# Patient Record
Sex: MALE | Race: OTHER | HISPANIC OR LATINO
[De-identification: names, ages, dates, MRNs, and addresses within clinical notes are randomized per-mention and may not be internally consistent; named-entity substitution may affect disease eponyms.]

---
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
## 2018-03-27 ENCOUNTER — RECORD ABSTRACTING (OUTPATIENT)
Age: 18
End: 2018-03-27

## 2018-08-14 ENCOUNTER — TRANSCRIPTION ENCOUNTER (OUTPATIENT)
Age: 18
End: 2018-08-14

## 2018-08-23 LAB
ALMOND IGE QN: <0.1 KUA/L
BRAZIL NUT IGE QN: <0.1 KUA/L
COCONUT IGE QN: 0
COCONUT IGE QN: <0.1 KUA/L
DEPRECATED ALMOND IGE RAST QL: 0
DEPRECATED BRAZIL NUT IGE RAST QL: 0
DEPRECATED HAZELNUT IGE RAST QL: 0
DEPRECATED PEANUT IGE RAST QL: 0
HAZELNUT IGE QN: <0.1 KUA/L
PEANUT (RARA H) 1 IGE QN: <0.1 KUA/L
PEANUT (RARA H) 2 IGE QN: <0.1 KUA/L
PEANUT (RARA H) 3 IGE QN: <0.1 KUA/L
PEANUT (RARA H) 8 IGE QN: <0.1 KUA/L
PEANUT (RARA H) 9 IGE QN: <0.1 KUA/L
PEANUT IGE QN: <0.1 KUA/L
RARA H1 STORAGE PROTEIN (F422) CLASS: 0 (ref 0–?)
RARA H2 STORAGE PROTEIN (F423) CLASS: 0 (ref 0–?)
RARA H3 STORAGE PROTEIN (F424) CLASS: 0 (ref 0–?)
RARA H8 PR-10 PROTEIN (F352) CLASS: 0 (ref 0–?)
RARA H9 LIPID TRANSFERTP (F427) CLASS: 0 (ref 0–?)

## 2018-09-28 ENCOUNTER — APPOINTMENT (OUTPATIENT)
Dept: PEDIATRICS | Facility: CLINIC | Age: 18
End: 2018-09-28

## 2019-03-08 ENCOUNTER — EMERGENCY (EMERGENCY)
Facility: HOSPITAL | Age: 19
LOS: 1 days | Discharge: HOME | End: 2019-03-08
Attending: EMERGENCY MEDICINE
Payer: COMMERCIAL

## 2019-03-08 VITALS
RESPIRATION RATE: 17 BRPM | HEART RATE: 96 BPM | OXYGEN SATURATION: 99 % | SYSTOLIC BLOOD PRESSURE: 130 MMHG | DIASTOLIC BLOOD PRESSURE: 68 MMHG | TEMPERATURE: 99 F

## 2019-03-08 PROCEDURE — 73090 X-RAY EXAM OF FOREARM: CPT | Mod: 26,LT

## 2019-03-08 PROCEDURE — 73110 X-RAY EXAM OF WRIST: CPT | Mod: 26,LT

## 2019-03-08 PROCEDURE — 73110 X-RAY EXAM OF WRIST: CPT

## 2019-03-08 PROCEDURE — 73130 X-RAY EXAM OF HAND: CPT

## 2019-03-08 PROCEDURE — 99283 EMERGENCY DEPT VISIT LOW MDM: CPT

## 2019-03-08 PROCEDURE — 73120 X-RAY EXAM OF HAND: CPT

## 2019-03-08 PROCEDURE — 73120 X-RAY EXAM OF HAND: CPT | Mod: 26,LT

## 2019-03-08 PROCEDURE — 73130 X-RAY EXAM OF HAND: CPT | Mod: 26,LT

## 2019-03-08 PROCEDURE — 99284 EMERGENCY DEPT VISIT MOD MDM: CPT

## 2019-03-08 PROCEDURE — 73090 X-RAY EXAM OF FOREARM: CPT

## 2019-03-08 RX ORDER — ACETAMINOPHEN 500 MG
650 TABLET ORAL ONCE
Qty: 0 | Refills: 0 | Status: COMPLETED | OUTPATIENT
Start: 2019-03-08 | End: 2019-03-08

## 2019-03-08 RX ORDER — IBUPROFEN 200 MG
600 TABLET ORAL ONCE
Qty: 0 | Refills: 0 | Status: COMPLETED | OUTPATIENT
Start: 2019-03-08 | End: 2019-03-08

## 2019-03-08 RX ADMIN — Medication 600 MILLIGRAM(S): at 17:50

## 2019-03-08 RX ADMIN — Medication 650 MILLIGRAM(S): at 17:46

## 2019-03-08 NOTE — ED PROVIDER NOTE - OBJECTIVE STATEMENT
17 y/o M w no significant pmhx or pshx p/w l wrist pain  s/p injury x2weeks. Says he was weight lifting when injury occurred, wrist locked into place and bent back, pt continued weightlifting, wrist locked again and bar (35 lb on each side) fell on top of wrist, pt heard a 'crack'. +numbness tingling. +difficulty moving wrist. Pt has brace present. Endorsed Motrin twice in past 2 days. Has been icing the region. Not on daily meds. 17 y/o M w no significant pmhx or pshx p/w l wrist pain s/p injury x2weeks. Says he was weight lifting when injury occurred, wrist locked into place and bent back, pt continued weightlifting, wrist locked again and bar (35 lb on each side) fell on top of wrist, pt heard a 'crack'. +numbness tingling. +difficulty moving wrist. Pt has brace present. Endorsed Motrin twice in past 2 days. Has been icing the region. Not on daily meds.

## 2019-03-08 NOTE — ED PROVIDER NOTE - LOCATION
wrist wrist/strong radial, weak ulnar pulse able to oppose thumb to all digits,  intrinsic  muscles of hand all intact  no significant ulnar tenderness  swelling of ulnar aspect, posterior displacement of ulnar styloid process, posterior lateral displacement

## 2019-03-08 NOTE — ED PROVIDER NOTE - CLINICAL SUMMARY MEDICAL DECISION MAKING FREE TEXT BOX
17 y/o M w no sig pmhx or pshx s/p mechanical injury to left wrist, w exam consistent w possible displacement of ulna. get x ray likely place and splint w close ortho f/u vs ortho f/u here if intra articular or sig displacement . pain control and reassess. - DO Chance 19 y/o M w no sig pmhx or pshx s/p mechanical injury to left wrist, w exam consistent w possible displacement of ulna. get x ray likely place and splint w close ortho f/u vs ortho f/u here if intra articular or sig displacement . pain control and reassess. - DO Layne Fletcher MD - Attending Physician: Pt here with left wrist injury. +Pain with movement. No deformity. Xray, motrin, f/u outpatient

## 2019-03-08 NOTE — ED PROVIDER NOTE - ATTENDING CONTRIBUTION TO CARE
Layne Thomason MD - Attending Physician: I have personally seen and examined this patient with the resident/fellow.  I have fully participated in the care of this patient. I have reviewed all pertinent clinical information, including history, physical exam, plan and the Resident/Fellow’s note and agree except as noted. See MDM

## 2019-03-08 NOTE — ED PROVIDER NOTE - NSFOLLOWUPCLINICS_GEN_ALL_ED_FT
[facility] Orthopedic Surgery  Orthopedic Surgery  [address], 3rd & [address]th [place] [address]  Phone: [phone]  Fax:   Follow Up Time: 7-10 Days

## 2019-03-08 NOTE — ED ADULT NURSE NOTE - OBJECTIVE STATEMENT
pt has a left wrist injury   pulses and refill are wdl  he presents with a brace on his wrist pt has a left wrist injury   pulses and refill are wdl  he presents with a brace on his wrist  injury happened 3 weeks ago

## 2019-03-08 NOTE — ED PROVIDER NOTE - NSFOLLOWUPINSTRUCTIONS_ED_ALL_ED_FT
If you have any severe increase in pain, fever, uncontrollable nausea vomiting, or inability to tolerate eating and drinking you need to come right back to the emergency room.     You need to follow up with the Orthopedic surgeon, for now wear your wrist brace and you can take control your pain at home, you should take Ibuprofen 400 mg along with Tylenol 650mg every 6 hours. Taking these medications together has been shown to be more effective at relieving pain than taking them separately. These are both over the counter medications that you can  at your local pharmacy without a prescription. You need to respect all of the warnings on the bottles. You shouldn’t take these medications for more than a week without following up with your doctor.

## 2019-03-08 NOTE — ED PROVIDER NOTE - PHYSICAL EXAMINATION
left wrist: strong radial, weak ulnar pulse able to oppose thumb to all digits,   intrinsic  muscles of hand all intact   no significant ulnar tenderness  swelling of ulnar aspect, posterior displacement of ulnar styloid process,   posterior lateral displacement

## 2019-03-08 NOTE — ED ADULT NURSE NOTE - NSIMPLEMENTINTERV_GEN_ALL_ED
Implemented All Universal Safety Interventions:  [place] to call system. Call bell, personal items and telephone within reach. Instruct patient to call for assistance. Room bathroom lighting operational. Non-slip footwear when patient is off stretcher. Physically safe environment: no spills, clutter or unnecessary equipment. Stretcher in lowest position, wheels locked, appropriate side rails in place.
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
## 2021-03-29 ENCOUNTER — EMERGENCY (EMERGENCY)
Facility: HOSPITAL | Age: 21
LOS: 1 days | Discharge: HOME | End: 2021-03-29
Attending: EMERGENCY MEDICINE
Payer: OTHER GOVERNMENT

## 2021-03-29 VITALS
HEIGHT: 67 IN | DIASTOLIC BLOOD PRESSURE: 75 MMHG | WEIGHT: 190.04 LBS | OXYGEN SATURATION: 100 % | HEART RATE: 109 BPM | RESPIRATION RATE: 18 BRPM | SYSTOLIC BLOOD PRESSURE: 135 MMHG | TEMPERATURE: 98 F

## 2021-03-29 VITALS
DIASTOLIC BLOOD PRESSURE: 82 MMHG | OXYGEN SATURATION: 97 % | RESPIRATION RATE: 18 BRPM | SYSTOLIC BLOOD PRESSURE: 135 MMHG | HEART RATE: 95 BPM | TEMPERATURE: 98 F

## 2021-03-29 PROCEDURE — 99284 EMERGENCY DEPT VISIT MOD MDM: CPT

## 2021-03-29 PROCEDURE — 73610 X-RAY EXAM OF ANKLE: CPT | Mod: 26,LT

## 2021-03-29 PROCEDURE — 99284 EMERGENCY DEPT VISIT MOD MDM: CPT | Mod: 25

## 2021-03-29 PROCEDURE — 73564 X-RAY EXAM KNEE 4 OR MORE: CPT | Mod: 26,LT

## 2021-03-29 PROCEDURE — 73610 X-RAY EXAM OF ANKLE: CPT

## 2021-03-29 PROCEDURE — 73590 X-RAY EXAM OF LOWER LEG: CPT

## 2021-03-29 PROCEDURE — 73590 X-RAY EXAM OF LOWER LEG: CPT | Mod: 26,LT

## 2021-03-29 PROCEDURE — 73564 X-RAY EXAM KNEE 4 OR MORE: CPT

## 2021-03-29 RX ORDER — IBUPROFEN 200 MG
600 TABLET ORAL ONCE
Refills: 0 | Status: COMPLETED | OUTPATIENT
Start: 2021-03-29 | End: 2021-03-29

## 2021-03-29 RX ADMIN — Medication 600 MILLIGRAM(S): at 20:20

## 2021-03-29 NOTE — ED PROVIDER NOTE - RAPID ASSESSMENT
20 year old M presents to ED s/p L knee injury. Pt reports he was bent down to work on a machine and when getting up he hyper extended his knee. Endorses medial right knee pain radiating to back and upper thigh. States he can't move his foot to the right 2/2 pain. No pain meds PTA.     Patient was seen as a tele QDOC patient. The patient will be seen and further worked up in the main emergency department and their care will be completed by the main emergency department team along with a thorough physical exam. Receiving team will follow up on labs, analgesia, any clinical imaging, reassess and disposition as clinically indicated, all decisions regarding the progression of care will be made at their discretion.  Scribe Statement: I, Jaguar Callejas, attest that this documentation has been prepared under the direction and in the presence of Layne Thomason) 20 year old M presents to ED s/p L knee injury. Pt reports he was bent down to work on a machine and when getting up he hyper extended his knee. Endorses medial left knee pain radiating to back and upper thigh. States he can't move his foot to the right 2/2 pain. No pain meds PTA.     Patient was seen as a tele QDOC patient. The patient will be seen and further worked up in the main emergency department and their care will be completed by the main emergency department team along with a thorough physical exam. Receiving team will follow up on labs, analgesia, any clinical imaging, reassess and disposition as clinically indicated, all decisions regarding the progression of care will be made at their discretion.  Scribe Statement: I, Jaguar Callejas, attest that this documentation has been prepared under the direction and in the presence of Layne Thomason (MD)    Layne Thomason MD - Attending Physician: The scribe's documentation has been prepared under my direction and personally reviewed by me in its entirety. I confirm that the note above accurately reflects all work, treatment, procedures, and medical decision making performed by me.

## 2021-03-29 NOTE — ED PROVIDER NOTE - CLINICAL SUMMARY MEDICAL DECISION MAKING FREE TEXT BOX
20y m no pmhx p/w left knee pain. pt reports left knee pain/limited ROM x6 days after standing up from kneeled position. pt had similar injury with medial meniscus tear in the past. vitals wnl. pt well appearing, L medial knee tenderness, hesitant to flex/extend knee but no obvious deformities. XRs negative for acute fx or dislocation. likely meniscal or ligament tear. will immobilize, instruct on supportive care and followup for scheduled MRI - Boris Hubbard PA-C

## 2021-03-29 NOTE — ED PROVIDER NOTE - PATIENT PORTAL LINK FT
Attending Attestation (For Attendings USE Only)... You can access the FollowMyHealth Patient Portal offered by [facility] by registering at the following website: [email or web address]. By joining [employer]’s FollowMyHealth portal, you will also be able to view your health information using other applications (apps) compatible with our system.

## 2021-03-29 NOTE — ED ADULT NURSE NOTE - OBJECTIVE STATEMENT
PAtient is a 20 yr old male who presents to the ED from home s/p knee injury. Per patient he was bending down and hyper extended his left knee and now endorses pain. Upon assessment, patient is AOx4, VSS, abdomen soft/non tender, skin intact, +pulses, left leg tenderness/pain, and denies n/v/d/f/chills/SOB/CP/headache/cough/covid contact. Provider assessed at bedside, meds ordered given in Qdoc, all safety precautions maintained, and will continue to monitor. PAtient able to easily ambulate.

## 2021-03-29 NOTE — ED PROVIDER NOTE - OBJECTIVE STATEMENT
20y m no pmhx p/w left knee pain. pt reports left knee pain/limited ROM x6 days after standing up from kneeled position, he heard a tear to his medial left knee and has had difficulty walking since then. has been using crutches to ambulate. reports minimal relief from motrin and tylenol, taking it infrequently. pt reports a diagnosed left medial meniscus tear several years ago, reports similar pain and limited ROM. has an appointment for an MRI of his knee in 3 days. Denies numbness, weakness, discoloration, or any other injuries. 20y m no pmhx p/w left knee pain. pt reports left knee pain/limited ROM x6 days after standing up from kneeled position, he heard a tear to his medial left knee and has had difficulty walking since then. has been using crutches to ambulate. reports minimal relief from motrin and tylenol, taking it infrequently. pt reports a diagnosed left medial meniscus tear several years ago, reports similar pain and limited ROM. has an appointment for an MRI of his knee in 3 days. Denies numbness, weakness, discoloration, or any other injuries.    Attendinyo male prseents with left knee pain s/p injury last week.  states unable to walk. scheduled for MRI on thurs.  no swelling.

## 2021-03-29 NOTE — ED PROVIDER NOTE - NSFOLLOWUPINSTRUCTIONS_ED_ALL_ED_FT
Please follow up with your primary care doctor within 1 week.  Follow up with your orthopedist as scheduled for your MRI this week    Take over-the-counter anti-inflammatory medications such as ibuprofen daily for pain and swelling.     Rest. Apply cold pack for 20 minutes multiple times daily. Elevate. You may change the dressing daily for comfort.    Return to the ED for worsening, pain, numbness, weakness, or any other concerns.

## 2021-03-29 NOTE — ED ADULT TRIAGE NOTE - NS ED TRIAGE AVPU SCALE
Alert-The patient is alert, awake and responds to voice. The patient is oriented to time, place, and person. The triage nurse is able to obtain subjective information. none

## 2022-09-08 ENCOUNTER — EMERGENCY (EMERGENCY)
Facility: HOSPITAL | Age: 22
LOS: 1 days | Discharge: HOME | End: 2022-09-08
Attending: STUDENT IN AN ORGANIZED HEALTH CARE EDUCATION/TRAINING PROGRAM
Payer: OTHER GOVERNMENT

## 2022-09-08 VITALS
HEART RATE: 112 BPM | RESPIRATION RATE: 18 BRPM | WEIGHT: 179.9 LBS | OXYGEN SATURATION: 98 % | SYSTOLIC BLOOD PRESSURE: 121 MMHG | HEIGHT: 67 IN | TEMPERATURE: 98 F | DIASTOLIC BLOOD PRESSURE: 89 MMHG

## 2022-09-08 LAB
ALP SERPL-CCNC: 125 U/L — HIGH (ref 40–120)
ALT FLD-CCNC: 25 U/L — SIGNIFICANT CHANGE UP (ref 10–45)
ANION GAP SERPL CALC-SCNC: 13 MMOL/L — SIGNIFICANT CHANGE UP (ref 5–17)
APAP SERPL-MCNC: <15 UG/ML — SIGNIFICANT CHANGE UP (ref 10–30)
AST SERPL-CCNC: 25 U/L — SIGNIFICANT CHANGE UP (ref 10–40)
BASOPHILS # BLD AUTO: 0.05 K/UL — SIGNIFICANT CHANGE UP (ref 0–0.2)
BASOPHILS NFR BLD AUTO: 0.5 % — SIGNIFICANT CHANGE UP (ref 0–2)
BILIRUB SERPL-MCNC: 0.2 MG/DL — SIGNIFICANT CHANGE UP (ref 0.2–1.2)
BUN SERPL-MCNC: 14 MG/DL — SIGNIFICANT CHANGE UP (ref 7–23)
CALCIUM SERPL-MCNC: 10.1 MG/DL — SIGNIFICANT CHANGE UP (ref 8.4–10.5)
CHLORIDE SERPL-SCNC: 103 MMOL/L — SIGNIFICANT CHANGE UP (ref 96–108)
CO2 SERPL-SCNC: 25 MMOL/L — SIGNIFICANT CHANGE UP (ref 22–31)
CREAT SERPL-MCNC: 1.14 MG/DL — SIGNIFICANT CHANGE UP (ref 0.5–1.3)
EGFR: 92 ML/MIN/1.73M2 — SIGNIFICANT CHANGE UP
EOSINOPHIL # BLD AUTO: 0.17 K/UL — SIGNIFICANT CHANGE UP (ref 0–0.5)
EOSINOPHIL NFR BLD AUTO: 1.7 % — SIGNIFICANT CHANGE UP (ref 0–6)
ETHANOL SERPL-MCNC: <10 MG/DL — SIGNIFICANT CHANGE UP (ref 0–10)
GLUCOSE SERPL-MCNC: 98 MG/DL — SIGNIFICANT CHANGE UP (ref 70–99)
HCT VFR BLD CALC: 41.9 % — SIGNIFICANT CHANGE UP (ref 39–50)
HGB BLD-MCNC: 13.9 G/DL — SIGNIFICANT CHANGE UP (ref 13–17)
IMM GRANULOCYTES NFR BLD AUTO: 0.3 % — SIGNIFICANT CHANGE UP (ref 0–1.5)
LYMPHOCYTES # BLD AUTO: 3.25 K/UL — SIGNIFICANT CHANGE UP (ref 1–3.3)
LYMPHOCYTES # BLD AUTO: 31.9 % — SIGNIFICANT CHANGE UP (ref 13–44)
MCHC RBC-ENTMCNC: 28.7 PG — SIGNIFICANT CHANGE UP (ref 27–34)
MCHC RBC-ENTMCNC: 33.2 GM/DL — SIGNIFICANT CHANGE UP (ref 32–36)
MCV RBC AUTO: 86.6 FL — SIGNIFICANT CHANGE UP (ref 80–100)
MONOCYTES # BLD AUTO: 0.63 K/UL — SIGNIFICANT CHANGE UP (ref 0–0.9)
MONOCYTES NFR BLD AUTO: 6.2 % — SIGNIFICANT CHANGE UP (ref 2–14)
NEUTROPHILS # BLD AUTO: 6.05 K/UL — SIGNIFICANT CHANGE UP (ref 1.8–7.4)
NEUTROPHILS NFR BLD AUTO: 59.4 % — SIGNIFICANT CHANGE UP (ref 43–77)
NRBC # BLD: 0 /100 WBCS — SIGNIFICANT CHANGE UP (ref 0–0)
PLATELET # BLD AUTO: 301 K/UL — SIGNIFICANT CHANGE UP (ref 150–400)
POTASSIUM SERPL-MCNC: 3.9 MMOL/L — SIGNIFICANT CHANGE UP (ref 3.5–5.3)
POTASSIUM SERPL-SCNC: 3.9 MMOL/L — SIGNIFICANT CHANGE UP (ref 3.5–5.3)
PROT SERPL-MCNC: 7.9 G/DL — SIGNIFICANT CHANGE UP (ref 6–8.3)
RBC # BLD: 4.84 M/UL — SIGNIFICANT CHANGE UP (ref 4.2–5.8)
RBC # FLD: 12.1 % — SIGNIFICANT CHANGE UP (ref 10.3–14.5)
SALICYLATES SERPL-MCNC: <2 MG/DL — LOW (ref 15–30)
SODIUM SERPL-SCNC: 141 MMOL/L — SIGNIFICANT CHANGE UP (ref 135–145)
WBC # BLD: 10.18 K/UL — SIGNIFICANT CHANGE UP (ref 3.8–10.5)
WBC # FLD AUTO: 10.18 K/UL — SIGNIFICANT CHANGE UP (ref 3.8–10.5)

## 2022-09-08 PROCEDURE — 73610 X-RAY EXAM OF ANKLE: CPT | Mod: 26,LT

## 2022-09-08 PROCEDURE — 73630 X-RAY EXAM OF FOOT: CPT | Mod: 26,LT

## 2022-09-08 PROCEDURE — 73590 X-RAY EXAM OF LOWER LEG: CPT | Mod: 26,LT

## 2022-09-08 PROCEDURE — 70486 CT MAXILLOFACIAL W/O DYE: CPT | Mod: 26,MA

## 2022-09-08 PROCEDURE — 99285 EMERGENCY DEPT VISIT HI MDM: CPT | Mod: 25

## 2022-09-08 PROCEDURE — 93010 ELECTROCARDIOGRAM REPORT: CPT

## 2022-09-08 PROCEDURE — 76377 3D RENDER W/INTRP POSTPROCES: CPT | Mod: 26

## 2022-09-08 RX ORDER — ACETAMINOPHEN 500 MG
1 TABLET ORAL
Qty: 1 | Refills: 0
Start: 2022-09-08 | End: 2022-09-08
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
## 2022-09-08 NOTE — ED PROVIDER NOTE - PHYSICAL EXAMINATION
Gen: WDWN, NAD  HEENT: EOMI, no nasal discharge, mucous membranes moist. + mild L buccal edema, pt unable to break popsickle stick with b/l mandible, no trismus.   CV: RRR,  2+ radial pulse R   Resp: no accessory muscle use, no increased work of breathing  MSK: No open wounds, no bruising, no LE edema, + mild edema to the L lateral ankle.   Neuro: A&Ox4, following commands, moving all four extremities spontaneously  Psych: appropriate mood

## 2022-09-08 NOTE — ED PROVIDER NOTE - PATIENT PORTAL LINK FT
You can access the FollowMyHealth Patient Portal offered by [facility] by registering at the following website: [email or web address]. By joining [employer]’s FollowMyHealth portal, you will also be able to view your health information using other applications (apps) compatible with our system.

## 2022-09-08 NOTE — ED PROVIDER NOTE - PROGRESS NOTE DETAILS
Joe DO PGY-3: consulted telepsych Pt signed out to me by Dr. Arredondo, pt resting comfortably on CO. Pending psych eval. RGUJRAL [facility] PGY3: Patient evaluated by behavioral health and cleared for discharge. Medically cleared by prior team, results reviewed again and no indication for further hospital management. Cortes tinsley. Pt seen by  and cleared for discharge pt comfortable with plan, [employer] consulted for transportation to home. PRABHJOT

## 2022-09-08 NOTE — ED PROVIDER NOTE - NSFOLLOWUPINSTRUCTIONS_ED_ALL_ED_FT
Please see a psychiatrist at [facility] for short-term psychiatric services and making a connection to long-term care, the hours are m-f 9am-3pm and the phone # is [phone]. The [facility] is located on the first floor of the [facility], within the campus of [facility]. The main entrance to our building is at the corner of [address] [place] [address] and [address] [place] [address] in [place], [place]. You can also access our campus through the hospital entrance at [phone] [place] [address]
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
## 2022-09-08 NOTE — ED ADULT NURSE NOTE - NSIMPLEMENTINTERV_GEN_ALL_ED
Implemented All Universal Safety Interventions:  [place] to call system. Call bell, personal items and telephone within reach. Instruct patient to call for assistance. Room bathroom lighting operational. Non-slip footwear when patient is off stretcher. Physically safe environment: no spills, clutter or unnecessary equipment. Stretcher in lowest position, wheels locked, appropriate side rails in place.
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
## 2022-09-09 VITALS
DIASTOLIC BLOOD PRESSURE: 70 MMHG | OXYGEN SATURATION: 96 % | TEMPERATURE: 98 F | SYSTOLIC BLOOD PRESSURE: 111 MMHG | HEART RATE: 68 BPM | RESPIRATION RATE: 18 BRPM

## 2022-09-09 DIAGNOSIS — F43.25 ADJUSTMENT DISORDER WITH MIXED DISTURBANCE OF EMOTIONS AND CONDUCT: ICD-10-CM

## 2022-09-09 LAB
ALBUMIN SERPL ELPH-MCNC: 4.9 G/DL — SIGNIFICANT CHANGE UP (ref 3.3–5)
AMPHET UR-MCNC: NEGATIVE — SIGNIFICANT CHANGE UP
APPEARANCE UR: CLEAR — SIGNIFICANT CHANGE UP
BARBITURATES UR SCN-MCNC: NEGATIVE — SIGNIFICANT CHANGE UP
BENZODIAZ UR-MCNC: NEGATIVE — SIGNIFICANT CHANGE UP
BILIRUB UR-MCNC: NEGATIVE — SIGNIFICANT CHANGE UP
COCAINE METAB.OTHER UR-MCNC: NEGATIVE — SIGNIFICANT CHANGE UP
COLOR SPEC: SIGNIFICANT CHANGE UP
DIFF PNL FLD: NEGATIVE — SIGNIFICANT CHANGE UP
FLUAV AG NPH QL: SIGNIFICANT CHANGE UP
FLUBV AG NPH QL: SIGNIFICANT CHANGE UP
GLUCOSE UR QL: NEGATIVE — SIGNIFICANT CHANGE UP
KETONES UR-MCNC: NEGATIVE — SIGNIFICANT CHANGE UP
LEUKOCYTE ESTERASE UR-ACNC: NEGATIVE — SIGNIFICANT CHANGE UP
METHADONE UR-MCNC: NEGATIVE — SIGNIFICANT CHANGE UP
NITRITE UR-MCNC: NEGATIVE — SIGNIFICANT CHANGE UP
OPIATES UR-MCNC: NEGATIVE — SIGNIFICANT CHANGE UP
OXYCODONE UR-MCNC: NEGATIVE — SIGNIFICANT CHANGE UP
PCP SPEC-MCNC: SIGNIFICANT CHANGE UP
PCP UR-MCNC: NEGATIVE — SIGNIFICANT CHANGE UP
PH UR: 6 — SIGNIFICANT CHANGE UP (ref 5–8)
PROT UR-MCNC: NEGATIVE — SIGNIFICANT CHANGE UP
RSV RNA NPH QL NAA+NON-PROBE: SIGNIFICANT CHANGE UP
SARS-COV-2 RNA SPEC QL NAA+PROBE: SIGNIFICANT CHANGE UP
SP GR SPEC: 1.02 — SIGNIFICANT CHANGE UP (ref 1.01–1.02)
THC UR QL: NEGATIVE — SIGNIFICANT CHANGE UP
TSH SERPL-MCNC: 3.79 UIU/ML — SIGNIFICANT CHANGE UP (ref 0.27–4.2)
UROBILINOGEN FLD QL: NEGATIVE — SIGNIFICANT CHANGE UP

## 2022-09-09 PROCEDURE — 73590 X-RAY EXAM OF LOWER LEG: CPT

## 2022-09-09 PROCEDURE — 99285 EMERGENCY DEPT VISIT HI MDM: CPT | Mod: 25

## 2022-09-09 PROCEDURE — 84443 ASSAY THYROID STIM HORMONE: CPT

## 2022-09-09 PROCEDURE — 93005 ELECTROCARDIOGRAM TRACING: CPT

## 2022-09-09 PROCEDURE — 73630 X-RAY EXAM OF FOOT: CPT

## 2022-09-09 PROCEDURE — 81003 URINALYSIS AUTO W/O SCOPE: CPT

## 2022-09-09 PROCEDURE — 85025 COMPLETE CBC W/AUTO DIFF WBC: CPT

## 2022-09-09 PROCEDURE — 70486 CT MAXILLOFACIAL W/O DYE: CPT | Mod: MA

## 2022-09-09 PROCEDURE — 76377 3D RENDER W/INTRP POSTPROCES: CPT

## 2022-09-09 PROCEDURE — 80053 COMPREHEN METABOLIC PANEL: CPT

## 2022-09-09 PROCEDURE — 90792 PSYCH DIAG EVAL W/MED SRVCS: CPT | Mod: 95

## 2022-09-09 PROCEDURE — 73610 X-RAY EXAM OF ANKLE: CPT

## 2022-09-09 PROCEDURE — 87637 SARSCOV2&INF A&B&RSV AMP PRB: CPT

## 2022-09-09 PROCEDURE — 36415 COLL VENOUS BLD VENIPUNCTURE: CPT

## 2022-09-09 PROCEDURE — 80307 DRUG TEST PRSMV CHEM ANLYZR: CPT

## 2022-09-09 RX ORDER — IBUPROFEN 200 MG
600 TABLET ORAL ONCE
Refills: 0 | Status: COMPLETED | OUTPATIENT
Start: 2022-09-09 | End: 2022-09-09

## 2022-09-09 RX ADMIN — Medication 600 MILLIGRAM(S): at 08:56

## 2022-09-09 NOTE — CHART NOTE - NSCHARTNOTEFT_GEN_A_CORE
EMERGENCY : SW consulted by psychiatry and ED RN as patient both medically and psychiatrically cleared for discharge and in need of [employer] for transportation home. LMSW reviewed patient's chart. As per chart review patient is a "21YO M hx PTSD, MDD, p/w HI."     As per Attending Psychiatrist, patient and parents both comfortable with patient returning home upon discharge today. Patient A&Ox4 at this time, caregiver declined. Patient resides in [place], [place] with his parents. Patient states has no money for transportation home. Patient appropriate for bus transportation as per RN. Patient provided with metrocard #[id number]. Patient states no further needs at present. Ongoing social work availability ensured. SW continues to remain available for any further needs.
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
## 2022-09-09 NOTE — BH CONSULTATION LIAISON PROGRESS NOTE - NSBHCHARTREVIEWLAB_PSY_A_CORE FT
13.9   10.18 )-----------( 301      ( 08 Sep 2022 23:04 )             41.9   09-08    141  |  103  |  14  ----------------------------<  98  3.9   |  25  |  1.14    Ca    10.1      08 Sep 2022 23:04    TPro  7.9  /  Alb  4.9  /  TBili  0.2  /  DBili  x   /  AST  25  /  ALT  25  /  AlkPhos  125<H>  09-08

## 2022-09-09 NOTE — ED BEHAVIORAL HEALTH ASSESSMENT NOTE - NSBHATTESTCOMMENTATTENDFT_PSY_A_CORE
Pt seen and evaluated by writer and medical student together.  Modifications to documentation made as above.

## 2022-09-09 NOTE — BH CONSULTATION LIAISON PROGRESS NOTE - NSBHCONSULTFOLLOWAFTERCARE_PSY_A_CORE FT
Referral given for outpatient [facility]   F/U with Dr. Lazaro, current Psychiatrist
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
## 2022-09-09 NOTE — ED BEHAVIORAL HEALTH ASSESSMENT NOTE - NSSUICRSKFACTOR_PSY_ALL_CORE
Current and Past Psychiatric Diagnoses/Presenting Symptoms/Historical Factors/Activating Events/Stressors Current and Past Psychiatric Diagnoses/Presenting Symptoms/Historical Factors/Treatment Related Factors/Activating Events/Stressors

## 2022-09-09 NOTE — ED ADULT NURSE REASSESSMENT NOTE - NS ED NURSE REASSESS COMMENT FT1
Pt is low key, pleasant on approach, C/O ankle pain and given motrin, GF at bedside, VSS, CO in place.
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
## 2022-09-09 NOTE — ED BEHAVIORAL HEALTH ASSESSMENT NOTE - RISK ASSESSMENT
Low Acute Suicide Risk risk factors: hx SIB/SA, prior admission, recent agitation/aggression, male    protective factors: engaged in work, denies access to guns, identifies reasons to live, denies active SI, denies active substance use, Mosque beliefs
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
## 2022-09-09 NOTE — ED BEHAVIORAL HEALTH NOTE - BEHAVIORAL HEALTH NOTE
===================      PRE-HOSPITAL COURSE      ===================      SOURCE:  Secondhand EMR documentation.       DETAILS:  Patient BIBEMS; chief complaint of HI.     ===========      ED COURSE:      ============      SOURCE:  RN and secondhand EMR documentation.       ARRIVAL:  Patient was cooperative with hospital protocol and allowed for gowning/wanding without incident. Patient presents with good hygiene/grooming. Patient placed on 1:1 In private room for consult.       BELONGINGS:  None notable.      BEHAVIOR: Blood/urine provided for routine labs without noted incident. No SI/HI/AH/VH elicited. Patient is alert, oriented, and makes eye contact; speech of normal volume/rate accompanied by logical and linear thought process. Patient remains calm and cooperative with ED staff. Patient has been resting while in ED.   TREATMENT: Patient did not require medication intervention while in ED.    VISITORS:  Patient presently accompanied by fiance while in ED; interactions positive and supportive.

## 2022-09-09 NOTE — ED BEHAVIORAL HEALTH ASSESSMENT NOTE - NSSUICPROTFACT_PSY_ALL_CORE
Feels he has coping mechanisms that he can use once he is out of the home/Responsibility to children, family, or others/Identifies reasons for living/Supportive social network of family or friends/Engaged in work or school/Ability to cope with stress Feels he has coping mechanisms that he can use once he is out of the home/Responsibility to children, family, or others/Identifies reasons for living/Supportive social network of family or friends/Engaged in work or school/Positive therapeutic relationships/Ability to cope with stress

## 2022-09-09 NOTE — ED BEHAVIORAL HEALTH ASSESSMENT NOTE - BODY HABITUS
No further orders at this time. Hold vanco dose as trough is not WDL Medication Vanco held for one dose. NP notified and examined pt. no new orders at this time, NP aware, spoke with pt and cont to refuse Average build

## 2022-09-09 NOTE — BH CONSULTATION LIAISON PROGRESS NOTE - NSBHCHARTREVIEWVS_PSY_A_CORE FT
Vital Signs Last 24 Hrs  T(C): 36.5 (09 Sep 2022 10:24), Max: 36.9 (08 Sep 2022 21:51)  T(F): 97.7 (09 Sep 2022 10:24), Max: 98.5 (08 Sep 2022 21:51)  HR: 68 (09 Sep 2022 10:24) (68 - 112)  BP: 111/70 (09 Sep 2022 10:24) (111/70 - 136/86)  BP(mean): --  RR: 18 (09 Sep 2022 10:24) (18 - 18)  SpO2: 96% (09 Sep 2022 10:24) (96% - 98%)    Parameters below as of 09 Sep 2022 10:24  Patient On (Oxygen Delivery Method): room air

## 2023-03-15 ENCOUNTER — EMERGENCY (EMERGENCY)
Facility: HOSPITAL | Age: 23
LOS: 1 days | Discharge: HOME | End: 2023-03-15
Attending: EMERGENCY MEDICINE
Payer: OTHER GOVERNMENT

## 2023-03-15 VITALS
OXYGEN SATURATION: 99 % | HEIGHT: 67 IN | WEIGHT: 169.98 LBS | SYSTOLIC BLOOD PRESSURE: 115 MMHG | RESPIRATION RATE: 18 BRPM | DIASTOLIC BLOOD PRESSURE: 80 MMHG | HEART RATE: 81 BPM | TEMPERATURE: 98 F

## 2023-03-15 VITALS
RESPIRATION RATE: 16 BRPM | HEART RATE: 77 BPM | TEMPERATURE: 99 F | DIASTOLIC BLOOD PRESSURE: 67 MMHG | SYSTOLIC BLOOD PRESSURE: 108 MMHG | OXYGEN SATURATION: 100 %

## 2023-03-15 PROCEDURE — 10060 I&D ABSCESS SIMPLE/SINGLE: CPT | Mod: 54,RT

## 2023-03-15 PROCEDURE — 87640 STAPH A DNA AMP PROBE: CPT

## 2023-03-15 PROCEDURE — 10060 I&D ABSCESS SIMPLE/SINGLE: CPT

## 2023-03-15 PROCEDURE — 96372 THER/PROPH/DIAG INJ SC/IM: CPT | Mod: XU

## 2023-03-15 PROCEDURE — 87641 MR-STAPH DNA AMP PROBE: CPT

## 2023-03-15 PROCEDURE — 99283 EMERGENCY DEPT VISIT LOW MDM: CPT | Mod: 25

## 2023-03-15 PROCEDURE — 99284 EMERGENCY DEPT VISIT MOD MDM: CPT | Mod: 25

## 2023-03-15 RX ORDER — KETOROLAC TROMETHAMINE 30 MG/ML
30 SYRINGE (ML) INJECTION ONCE
Refills: 0 | Status: DISCONTINUED | OUTPATIENT
Start: 2023-03-15 | End: 2023-03-15

## 2023-03-15 RX ORDER — CEPHALEXIN 500 MG
1 CAPSULE ORAL
Qty: 20 | Refills: 0
Start: 2023-03-15 | End: 2023-03-19

## 2023-03-15 RX ORDER — IBUPROFEN 200 MG
1 TABLET ORAL
Qty: 40 | Refills: 0
Start: 2023-03-15 | End: 2023-03-24

## 2023-03-15 RX ORDER — LIDOCAINE HCL 20 MG/ML
5 VIAL (ML) INJECTION ONCE
Refills: 0 | Status: COMPLETED | OUTPATIENT
Start: 2023-03-15 | End: 2023-03-15

## 2023-03-15 RX ORDER — CEPHALEXIN 500 MG
500 CAPSULE ORAL ONCE
Refills: 0 | Status: COMPLETED | OUTPATIENT
Start: 2023-03-15 | End: 2023-03-15

## 2023-03-15 RX ADMIN — Medication 500 MILLIGRAM(S): at 22:18

## 2023-03-15 RX ADMIN — Medication 30 MILLIGRAM(S): at 22:17

## 2023-03-15 RX ADMIN — Medication 5 MILLILITER(S): at 22:20

## 2023-03-15 NOTE — ED PROVIDER NOTE - PHYSICAL EXAMINATION
Physical Exam:   GEN: in no acute distress, AAOx3  HENT: NCAT, MMM, no stridor  EYES: PERRLA, EOMI  RESP: CTAB, no respiratory distress  CV: normal rate and regular rhythm, S1 S2  ABD: soft and NTND  EXT: No edema, No bony deformity of extremities  SKIN: No skin breaks, skin color normal for race. small Mildly fluctuant indurated lesion appreciated the right axilla, measuring approximately 2-1/2 cm in diameter, with a central opening draining thick purulent drainage with some mild blood.  Mild tenderness to palpation, some small erythematous streaking appreciated tracking inferiorly from the induration.  NEURO: CN grossly intact, No focal motor or sensory deficits.

## 2023-03-15 NOTE — ED PROVIDER NOTE - NSFOLLOWUPINSTRUCTIONS_ED_ALL_ED_FT
1) Please follow-up with your Primary Medical Doctor in 3-5 days. If you do not have a primary doctor, please call the Physician Referral Service. If you have trouble making an appointment inform the office that you were recently seen in the Emergency Department and it is an urgent matter. Bring a copy of your results with you to your appointment.  2) Return to the Emergency Department for persistent, worsening, or new symptoms, or if you experience fever, chills, chest pain, shortness of breath, dizziness, fainting, abdominal pain, nausea or vomiting, inability to eat or drink, difficulty with urination, numbness, weakness, or inability to walk safely.

## 2023-03-15 NOTE — ED ADULT NURSE NOTE - OBJECTIVE STATEMENT
22 y.o. male coming in from home via private car for cyst in right axillary. pt states that he has had a cyst in his right axillary and noticed it was draining blood. PMH peanut and chocolate allergy. A&Ox3, vss, no drainage noted in right axillary cyst at the moment, pt endorses right arm numbness, AROM of right upper extremity, pulses present in right upper extremity, no other complaints at this time.

## 2023-03-15 NOTE — ED PROVIDER NOTE - CLINICAL SUMMARY MEDICAL DECISION MAKING FREE TEXT BOX
Abscess drained as noted in the procedure note, patient reports feeling better, was given a dose of Keflex given concerns for cellulitis with streaking, given ibuprofen for pain, will discharge with ibuprofen as well as Keflex with instructions to follow-up with his doctor in the next 2 days, patient understood and was discharged in stable condition.

## 2023-03-15 NOTE — ED PROVIDER NOTE - PATIENT PORTAL LINK FT
You can access the FollowMyHealth Patient Portal offered by [facility] by registering at the following website: [email or web address]. By joining [employer]’s FollowMyHealth portal, you will also be able to view your health information using other applications (apps) compatible with our system.

## 2023-03-15 NOTE — ED PROVIDER NOTE - OBJECTIVE STATEMENT
22-year-old male with no signal past medical history presenting with concerns for cyst in the right armpit.  Patient states he has noted developing over the past few days, had tried to shave his hair to get access to it, reported some pain there, and then while working out today, felt the cyst spontaneously opened, reports some purulent drainage, denies some continued mild pain there, has not taken anything for pain, denies any fever, chills, nausea, vomiting, diarrhea, numbness, tingling, paresthesias.  Has never had in the armpit before.  Does report that he once had a cyst in his "" tailbone.

## 2023-03-15 NOTE — ED PROVIDER NOTE - ATTENDING CONTRIBUTION TO CARE
Attending MD Levy: I personally have seen and examined this patient.  Fellow note reviewed and agree on plan of care and except where noted.  See below for details.     Seen in [facility]    22M with no reported contributory PMH/PSH/Meds, no known drug allergies presents to the ED with R axillary pain.  Reports that over the last few days has had increasing pain to the R axilla.  Reports while at the gym today, axilla started spontaneously draining, purulence.  Reports persistent pain, denies taking analgesic.  Denies fevers, chills.  Denies limitation of ROM of RUE.  Denies previous axillary abscess.  Reports had one on his "tailbone".      Exam:   General: NAD  HENT: head NCAT, airway patent   Chest: symmetric chest rise, no increased work of breathing  MSK: ranging neck freely  Neuro: moving all extremities spontaneously, sensory grossly intact, no gross neuro deficits  Psych: normal mood and affect   Skin: +2.5cm area of induration, central spontaneous drainage, +tenderness, small area of streaking erythema <5cm inferior to wound    A/P: 22M with R axillary abscess, spontaneously draining, with cellulitis, will extend opening, will swab for MRSA, will give abx, first dose here

## 2023-03-15 NOTE — ED PROVIDER NOTE - CARE PLAN
Principal Discharge DX:	Encounter for incision and drainage procedure  Secondary Diagnosis:	Abscess of right axilla   1
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
## 2023-03-15 NOTE — ED PROCEDURE NOTE - PROCEDURE ADDITIONAL DETAILS
expanded Spontaneously open wound, expanded with 11 scalpel by approximately 3 mm, manually pressed on induration which exuded minimal pus, no significant cavitations, no packing indicated.

## 2023-03-16 LAB
MRSA PCR RESULT.: DETECTED
S AUREUS DNA NOSE QL NAA+PROBE: DETECTED

## 2023-03-17 NOTE — ED POST DISCHARGE NOTE - DETAILS
Spoke with patient and made him aware of abnormal result.  Patient reports that he is still having some discomfort to the area, but denies fevers, expanding erythema, increased discharge.  Given positive MRSA will add doxycycline.  Patient to follow up with his PMD on Monday.  Strict return precautions provided. -Andres Palmer PA-C

## 2023-03-20 ENCOUNTER — EMERGENCY (EMERGENCY)
Facility: HOSPITAL | Age: 23
LOS: 1 days | Discharge: HOME | End: 2023-03-20
Attending: EMERGENCY MEDICINE
Payer: OTHER GOVERNMENT

## 2023-03-20 VITALS
RESPIRATION RATE: 18 BRPM | DIASTOLIC BLOOD PRESSURE: 87 MMHG | HEART RATE: 81 BPM | TEMPERATURE: 98 F | OXYGEN SATURATION: 98 % | SYSTOLIC BLOOD PRESSURE: 128 MMHG | WEIGHT: 164.91 LBS | HEIGHT: 67 IN

## 2023-03-20 PROCEDURE — 99284 EMERGENCY DEPT VISIT MOD MDM: CPT

## 2023-03-20 PROCEDURE — 99053 MED SERV 10PM-8AM 24 HR FAC: CPT

## 2023-03-20 NOTE — ED ADULT TRIAGE NOTE - CHIEF COMPLAINT QUOTE
blurry vision, muscle twitching, fever, cough, lightheaded, hand pain  after taking antibiotic 5 days

## 2023-03-21 VITALS
RESPIRATION RATE: 19 BRPM | DIASTOLIC BLOOD PRESSURE: 89 MMHG | SYSTOLIC BLOOD PRESSURE: 129 MMHG | OXYGEN SATURATION: 98 % | HEART RATE: 87 BPM | TEMPERATURE: 99 F

## 2023-03-21 LAB
ALBUMIN SERPL ELPH-MCNC: 4.5 G/DL — SIGNIFICANT CHANGE UP (ref 3.3–5)
ALP SERPL-CCNC: 100 U/L — SIGNIFICANT CHANGE UP (ref 40–120)
ALT FLD-CCNC: 17 U/L — SIGNIFICANT CHANGE UP (ref 10–45)
ANION GAP SERPL CALC-SCNC: 11 MMOL/L — SIGNIFICANT CHANGE UP (ref 5–17)
AST SERPL-CCNC: 18 U/L — SIGNIFICANT CHANGE UP (ref 10–40)
BASOPHILS # BLD AUTO: 0.04 K/UL — SIGNIFICANT CHANGE UP (ref 0–0.2)
BASOPHILS NFR BLD AUTO: 0.4 % — SIGNIFICANT CHANGE UP (ref 0–2)
BILIRUB SERPL-MCNC: 0.2 MG/DL — SIGNIFICANT CHANGE UP (ref 0.2–1.2)
BUN SERPL-MCNC: 14 MG/DL — SIGNIFICANT CHANGE UP (ref 7–23)
CALCIUM SERPL-MCNC: 9.6 MG/DL — SIGNIFICANT CHANGE UP (ref 8.4–10.5)
CHLORIDE SERPL-SCNC: 103 MMOL/L — SIGNIFICANT CHANGE UP (ref 96–108)
CO2 SERPL-SCNC: 26 MMOL/L — SIGNIFICANT CHANGE UP (ref 22–31)
CREAT SERPL-MCNC: 1.2 MG/DL — SIGNIFICANT CHANGE UP (ref 0.5–1.3)
EGFR: 87 ML/MIN/1.73M2 — SIGNIFICANT CHANGE UP
EOSINOPHIL # BLD AUTO: 0.19 K/UL — SIGNIFICANT CHANGE UP (ref 0–0.5)
EOSINOPHIL NFR BLD AUTO: 1.9 % — SIGNIFICANT CHANGE UP (ref 0–6)
GLUCOSE SERPL-MCNC: 95 MG/DL — SIGNIFICANT CHANGE UP (ref 70–99)
HCT VFR BLD CALC: 38.2 % — LOW (ref 39–50)
HGB BLD-MCNC: 12.5 G/DL — LOW (ref 13–17)
IMM GRANULOCYTES NFR BLD AUTO: 0.3 % — SIGNIFICANT CHANGE UP (ref 0–0.9)
LYMPHOCYTES # BLD AUTO: 3.91 K/UL — HIGH (ref 1–3.3)
LYMPHOCYTES # BLD AUTO: 39.9 % — SIGNIFICANT CHANGE UP (ref 13–44)
MAGNESIUM SERPL-MCNC: 2.1 MG/DL — SIGNIFICANT CHANGE UP (ref 1.6–2.6)
MCHC RBC-ENTMCNC: 29.1 PG — SIGNIFICANT CHANGE UP (ref 27–34)
MCHC RBC-ENTMCNC: 32.7 GM/DL — SIGNIFICANT CHANGE UP (ref 32–36)
MCV RBC AUTO: 88.8 FL — SIGNIFICANT CHANGE UP (ref 80–100)
MONOCYTES # BLD AUTO: 0.54 K/UL — SIGNIFICANT CHANGE UP (ref 0–0.9)
MONOCYTES NFR BLD AUTO: 5.5 % — SIGNIFICANT CHANGE UP (ref 2–14)
NEUTROPHILS # BLD AUTO: 5.1 K/UL — SIGNIFICANT CHANGE UP (ref 1.8–7.4)
NEUTROPHILS NFR BLD AUTO: 52 % — SIGNIFICANT CHANGE UP (ref 43–77)
NRBC # BLD: 0 /100 WBCS — SIGNIFICANT CHANGE UP (ref 0–0)
PLATELET # BLD AUTO: 317 K/UL — SIGNIFICANT CHANGE UP (ref 150–400)
POTASSIUM SERPL-MCNC: 3.8 MMOL/L — SIGNIFICANT CHANGE UP (ref 3.5–5.3)
POTASSIUM SERPL-SCNC: 3.8 MMOL/L — SIGNIFICANT CHANGE UP (ref 3.5–5.3)
PROT SERPL-MCNC: 6.9 G/DL — SIGNIFICANT CHANGE UP (ref 6–8.3)
RAPID RVP RESULT: SIGNIFICANT CHANGE UP
RBC # BLD: 4.3 M/UL — SIGNIFICANT CHANGE UP (ref 4.2–5.8)
RBC # FLD: 12.7 % — SIGNIFICANT CHANGE UP (ref 10.3–14.5)
SARS-COV-2 RNA SPEC QL NAA+PROBE: SIGNIFICANT CHANGE UP
SODIUM SERPL-SCNC: 140 MMOL/L — SIGNIFICANT CHANGE UP (ref 135–145)
WBC # BLD: 9.81 K/UL — SIGNIFICANT CHANGE UP (ref 3.8–10.5)
WBC # FLD AUTO: 9.81 K/UL — SIGNIFICANT CHANGE UP (ref 3.8–10.5)

## 2023-03-21 PROCEDURE — 99285 EMERGENCY DEPT VISIT HI MDM: CPT | Mod: 25

## 2023-03-21 PROCEDURE — 93005 ELECTROCARDIOGRAM TRACING: CPT

## 2023-03-21 PROCEDURE — 80053 COMPREHEN METABOLIC PANEL: CPT

## 2023-03-21 PROCEDURE — 85025 COMPLETE CBC W/AUTO DIFF WBC: CPT

## 2023-03-21 PROCEDURE — 71045 X-RAY EXAM CHEST 1 VIEW: CPT | Mod: 26

## 2023-03-21 PROCEDURE — 83735 ASSAY OF MAGNESIUM: CPT

## 2023-03-21 PROCEDURE — 71045 X-RAY EXAM CHEST 1 VIEW: CPT

## 2023-03-21 PROCEDURE — 0225U NFCT DS DNA&RNA 21 SARSCOV2: CPT

## 2023-03-21 PROCEDURE — 36415 COLL VENOUS BLD VENIPUNCTURE: CPT

## 2023-03-21 RX ORDER — SODIUM CHLORIDE 9 MG/ML
1000 INJECTION, SOLUTION INTRAVENOUS ONCE
Refills: 0 | Status: COMPLETED | OUTPATIENT
Start: 2023-03-21 | End: 2023-03-21

## 2023-03-21 RX ADMIN — SODIUM CHLORIDE 1000 MILLILITER(S): 9 INJECTION, SOLUTION INTRAVENOUS at 02:54

## 2023-03-21 NOTE — ED PROVIDER NOTE - NSFOLLOWUPINSTRUCTIONS_ED_ALL_ED_FT
You were seen in the Emergency Department for: dehydration, medication side effect    Please drink plenty of fluids as discussed.     Please follow up with your primary physician as discussed. If you do not have a primary physician or specialist of your needs, please call [phone] to find one convenient for you. At this number you will be able to locate a provider who accepts your insurance, as well as locate the right specialist for your needs.    You should return to the Emergency Department if you feel any new/worsening/persistent symptoms including but not limited to: chest pain, difficulty breathing, loss of consciousness, bleeding, uncontrolled pain, numbness/weakness of a body part
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
## 2023-03-21 NOTE — ED PROVIDER NOTE - PROGRESS NOTE DETAILS
Shayan PGY-3: Patient reassessed, resting comfortably, discussed nonactionable work up, patient agreeable to dc with pmd f/u, recommended continued plentiful PO hydration.

## 2023-03-21 NOTE — ED PROVIDER NOTE - NS ED ROS FT
Review of Systems:  -General: no fever or chills  -Pulmonary: no cough, no shortness of breath  -Cardiac: no chest pain, no palpitations  -Gastrointestinal: no abdominal pain, +nausea, no vomiting, and +diarrhea.  -Skin: no rashes  -Endocrine: No h/o diabetes or thyroid disease  -Neurologic: see hpi

## 2023-03-21 NOTE — ED PROVIDER NOTE - PHYSICAL EXAMINATION
On Physical Exam:  General: well appearing, in NAD, speaking clearly in full sentences and without difficulty; cooperative with exam  HEENT: PERRL, MMM  Neck: no neck tenderness, no nuchal rigidity  Cardiac: normal s1, s2; RRR; no MGR  Lungs: CTABL  Abdomen: soft nontender/nondistended  Skin: intact, no rash  Extremities: no peripheral edema, no gross deformities; mild tenderness in R axillary, no erythema, no bleeding/drainage; soft compartments throughtou RUE, no rashes/erythema, FROM of all joints; radil pulse palpable and brisk cap refill in fingers  Neuro: no gross neurologic deficits; 5/5 strength in shoulders, elbows, wrist/ b/l, sensation intact throughout RUE

## 2023-03-21 NOTE — ED PROVIDER NOTE - CLINICAL SUMMARY MEDICAL DECISION MAKING FREE TEXT BOX
Attending note (Ramesh): generalized weakness, numbness in RUE, diarrhea, fatigue; suspect partially from doxycycline related, r/o hypokalemia/hypomagnesiemia; ECG NSR without interval abnormalities, not concerned for acute cardiac issue. Normal neurologic exam with str/sensation inRUE, not concern for acut eneurologic compromise/central neurologic process. Possible realted to axillary induration causing peripheral nerve irritation.  Will check cbc (to evaluate for leukocytosis or anemia), CMP (to evaluate for electrolyte abnormalities or renal/liver dysfunction) including magnesium level, give iv fluids and reassess; anticipate discharge. Attending note (Ramesh): generalized weakness, numbness in RUE, diarrhea, fatigue; suspect partially from doxycycline related, r/o hypokalemia/ hypomagnesemia; ECG NSR without interval abnormalities, not concerned for acute cardiac issue. Normal neurologic exam with str/sensation in RUE, not concern for acute neurologic compromise/central neurologic process. Possible realted to axillary induration causing peripheral nerve irritation.  Will check cbc (to evaluate for leukocytosis or anemia), CMP (to evaluate for electrolyte abnormalities or renal/liver dysfunction) including magnesium level, give iv fluids and reassess; anticipate discharge.

## 2023-03-21 NOTE — ED ADULT NURSE NOTE - OBJECTIVE STATEMENT
Pt presents to the ED A&Ox4 co R arm pain numbness and tingling x 3 days. Pt endorses recent hx of R axillary abscess drainage. States he has been prescribed doxycycline, now reports watery stools. Denies fevers, NVD.

## 2023-03-21 NOTE — ED PROVIDER NOTE - PATIENT PORTAL LINK FT
You can access the FollowMyHealth Patient Portal offered by [facility] by registering at the following website: [email or web address]. By joining [employer]’s FollowMyHealth portal, you will also be able to view your health information using other applications (apps) compatible with our system.

## 2023-03-21 NOTE — ED PROVIDER NOTE - ATTENDING CONTRIBUTION TO CARE
Attending note (Ramesh): generalized weakness, numbness in RUE, diarrhea, fatigue; suspect partially from doxycycline related, r/o hypokalemia/ hypomagnesemia; ECG NSR without interval abnormalities, not concerned for acute cardiac issue. Normal neurologic exam with str/sensation in RUE, not concern for acute neurologic compromise/central neurologic process. Possible realted to axillary induration causing peripheral nerve irritation.  Will check cbc (to evaluate for leukocytosis or anemia), CMP (to evaluate for electrolyte abnormalities or renal/liver dysfunction) including magnesium level, give iv fluids and reassess; anticipate discharge.

## 2023-03-21 NOTE — ED PROVIDER NOTE - DISCHARGE DATE
After Visit Summary   7/10/2017    Bora Monroy    MRN: [id number]           Patient Information     Date Of Birth          1962        Visit Information        Provider Department      7/10/2017 7:40 AM Eduard Mazariegos MD [facility]        Today's Diagnoses     Seropositive rheumatoid arthritis of multiple sites (HCC)    -  1    High risk medication use          Care Instructions      Dr. Mazariegos s Rheumatology Clinics  Locations Clinic Hours Telephone Number   [facility] [place]  [address]  [place] [address]     Wednesday: 7:20AM - 4:00PM  Thursday:     7:20AM - 4:00PM     Friday:          7:20AM - 11:00AM       To schedule an appointment with  Dr. Mazariegos,  please contact  Specialty [phone]       [facility] [place]  [address]  [place] [address]       Monday:       7:20AM - 4:00PM      [facility] [place]  [address] [place] [address]       Tuesday:      7:20AM - 4:00PM          Thank you!    Shonna Kenny CMA              Follow-ups after your visit        Follow-up notes from your care team     Return in about 6 months (around 1/10/2018) for f/u RA.      Your next 10 appointments already scheduled     Oct 10, 2017  3:00 PM CDT   LAB with BE LAB   [facility] ([facility] [place])    [address]  [place] [id number] [phone]           Patient must bring picture ID.  Patient should be prepared to give a urine specimen  Please do not eat 10-12 hours before your appointment if you are coming in fasting for labs on lipids, cholesterol, or glucose (sugar).  Pregnant women should follow their Care Team instructions. Water with medications is okay. Do not drink coffee or other fluids.   If you have concerns about taking  your medications, please ask at office or if scheduling via [facility], send a message by clicking on Secure Messaging, Message Your Care Team.            2018   3:00 PM CST   LAB with BE LAB   [facility] ([facility])    [address]  [place] [address] [phone]           Patient must bring picture ID.  Patient should be prepared to give a urine specimen  Please do not eat 10-12 hours before your appointment if you are coming in fasting for labs on lipids, cholesterol, or glucose (sugar).  Pregnant women should follow their Care Team instructions. Water with medications is okay. Do not drink coffee or other fluids.   If you have concerns about taking  your medications, please ask at office or if scheduling via [employer], send a message by clicking on Secure Messaging, Message Your Care Team.            Jan 08, 2018  8:00 AM CST   Return Visit with Eduard Maazriegos MD   [facility] [place] ([facility])    [address]  [place] [id number] [phone]              Future tests that were ordered for you today     Open Future Orders        Priority Expected Expires Ordered    CBC with platelets differential Routine 10/4/2017 10/23/2017 7/10/2017    Creatinine Routine 10/4/2017 10/23/2017 7/10/2017    Hepatic panel Routine 10/4/2017 10/23/2017 7/10/2017            Who to contact     If you have questions or need follow up information about today's clinic visit or your schedule please contact [facility] directly at [phone].  Normal or non-critical lab and imaging results will be communicated to you by MyChart, letter or phone within 4 business days after the clinic has received the results. If you do not hear from us within 7 days, please contact the clinic through [employer] or phone. If you have a critical or abnormal lab result, we will notify you by phone as soon as possible.  Submit refill requests through [employer] or call your pharmacy and they will forward the refill request to us. Please allow 3 business days for your refill to be completed.          Additional Information About  "Your Visit        MyChart Information     [employer] gives you secure access to your electronic health record. If you see a primary care provider, you can also send messages to your care team and make appointments. If you have questions, please call your primary care clinic.  If you do not have a primary care provider, please call [phone] and they will assist you.        Care EveryWhere ID     This is your Care EveryWhere ID. This could be used by other organizations to access your [facility] medical records  [id number]        Your Vitals Were     Pulse Temperature Height Pulse Oximetry BMI (Body Mass Index)       58 97.9  F (36.6  C) (Oral) 1.81 m (5' 11.25\") 99% 23.54 kg/m2        Blood Pressure from Last 3 Encounters:   07/10/17 142/84   12/19/16 163/85   11/18/16 157/87    Weight from Last 3 Encounters:   07/10/17 77.1 kg (170 lb)   12/19/16 76.9 kg (169 lb 9.6 oz)   11/18/16 77.6 kg (171 lb)              We Performed the Following     CBC with platelets differential     Comprehensive metabolic panel     CRP inflammation     Erythrocyte sedimentation rate auto          Where to get your medicines      These medications were sent to [id number] - [place] [[facility] [place] - [address] [address]     Phone:  [phone]     leflunomide 20 MG tablet    predniSONE 5 MG tablet          Primary Care Provider Office Phone # Fax #    Yakov Jones [phone]       [facility] [address] [facility] [address]        Equal Access to Services     [facility]: Hadii aad ku hadasho Soomaali, waaxda luqadaha, qaybta kaalmada adeegyada, maryellen aviles . So [facility] [phone].    ATENCIÓN: Si habla español, tiene a olmedo disposición servicios gratuitos de asistencia lingüística. Llame al [phone].    We comply with applicable federal civil rights laws and [place] laws. We do not discriminate on the " basis of race, color, national origin, age, disability sex, sexual orientation or gender identity.            Thank you!     Thank you for choosing [facility]  for your care. Our goal is always to provide you with excellent care. Hearing back from our patients is one way we can continue to improve our services. Please take a few minutes to complete the written survey that you may receive in the mail after your visit with us. Thank you!             Your Updated Medication List - Protect others around you: Learn how to safely use, store and throw away your medicines at www.disposemymeds.org.          This list is accurate as of: 7/10/17  8:10 AM.  Always use your most recent med list.                   Brand Name Dispense Instructions for use Diagnosis    amLODIPine 5 MG tablet    NORVASC    90 tablet    Take 1 tablet (5 mg) by mouth daily    Hypertension goal BP (blood pressure) < 140/90       cinnamon 500 MG Tabs      Take 2 tablets by mouth daily.        fish oil-omega-3 fatty acids 1000 MG capsule      1 tablet daily        FLOMAX 0.4 MG capsule   Generic drug:  tamsulosin      Take 1 capsule (0.4 mg) by mouth daily        ibuprofen 600 MG tablet    ADVIL/MOTRIN     Take 1 tablet (600 mg) by mouth every 4 hours as needed for moderate pain        KRILL OIL PO      Take 1 tablet by mouth daily        leflunomide 20 MG tablet    ARAVA    90 tablet    Take 1 tablet (20 mg) by mouth daily    Seropositive rheumatoid arthritis of multiple sites (H)       METAMUCIL 30.9 % Powd   Generic drug:  psyllium      Take 1 tsp by mouth 2 times daily.        Multi-vitamin Tabs tablet   Generic drug:  multivitamin, therapeutic with minerals      1 TABLET DAILY        oxyCODONE-acetaminophen 5-325 MG per tablet    PERCOCET     Take 1 tablet by mouth every 4 hours as needed for pain        predniSONE 5 MG tablet    DELTASONE    90 tablet    Take 1 tablet (5 mg) by mouth daily    Seropositive rheumatoid arthritis of  multiple sites (H)       triamcinolone 0.1 % cream    KENALOG    60 g    Apply sparingly to affected area two to three times daily as needed for hand rash    Eczema, unspecified type       VITAMIN C PO      None Entered        VITAMIN E PO              21-Mar-2023

## 2023-06-09 ENCOUNTER — EMERGENCY (EMERGENCY)
Facility: HOSPITAL | Age: 23
LOS: 1 days | Discharge: LEFT BEFORE DISCHARGE | End: 2023-06-09
Payer: SELF-PAY

## 2023-06-09 VITALS
OXYGEN SATURATION: 98 % | WEIGHT: 195.11 LBS | TEMPERATURE: 98 F | RESPIRATION RATE: 16 BRPM | DIASTOLIC BLOOD PRESSURE: 80 MMHG | HEART RATE: 72 BPM | SYSTOLIC BLOOD PRESSURE: 127 MMHG

## 2023-06-09 PROCEDURE — L9991: CPT

## 2023-06-09 NOTE — ED ADULT TRIAGE NOTE - CHIEF COMPLAINT QUOTE
Pt. complaining of right hand pain after a dresser draw fell on top it on Wednesday. Pt. states he went to [facility] and had negative xrays.

## 2023-07-21 ENCOUNTER — EMERGENCY (EMERGENCY)
Facility: HOSPITAL | Age: 23
LOS: 1 days | Discharge: HOME | End: 2023-07-21
Attending: STUDENT IN AN ORGANIZED HEALTH CARE EDUCATION/TRAINING PROGRAM
Payer: SELF-PAY

## 2023-07-21 VITALS
TEMPERATURE: 98 F | HEART RATE: 78 BPM | OXYGEN SATURATION: 100 % | SYSTOLIC BLOOD PRESSURE: 115 MMHG | RESPIRATION RATE: 18 BRPM | DIASTOLIC BLOOD PRESSURE: 71 MMHG

## 2023-07-21 VITALS
RESPIRATION RATE: 20 BRPM | OXYGEN SATURATION: 98 % | TEMPERATURE: 99 F | HEART RATE: 76 BPM | HEIGHT: 64 IN | WEIGHT: 195.11 LBS | SYSTOLIC BLOOD PRESSURE: 135 MMHG | DIASTOLIC BLOOD PRESSURE: 86 MMHG

## 2023-07-21 PROCEDURE — 99283 EMERGENCY DEPT VISIT LOW MDM: CPT

## 2023-07-21 PROCEDURE — 99053 MED SERV 10PM-8AM 24 HR FAC: CPT

## 2023-07-21 PROCEDURE — 99284 EMERGENCY DEPT VISIT MOD MDM: CPT

## 2023-07-21 RX ORDER — CIPROFLOXACIN AND DEXAMETHASONE 3; 1 MG/ML; MG/ML
4 SUSPENSION/ DROPS AURICULAR (OTIC)
Qty: 1 | Refills: 0
Start: 2023-07-21 | End: 2023-07-27

## 2023-07-21 NOTE — ED ADULT NURSE NOTE - OBJECTIVE STATEMENT
24 yo M AOx4 ambulatory from home with no pertinent PMH c/o left ear pain. Pt states onset of left ear pain started at the beginning of July. Pt states he followed up with an audiologist and was prescribed ear drops that provided no relief. Pt states "my ears feel clogged up and when I hear loud sounds I get a piercing sounds that hurts in my left ear". Pt endorses left ear hearing loss. Pt states ear pain worsens with cough. Pt initially presents to [facility] ED in no acute distress with unlabored breathing. Comfort care and safety measures provided. Pt denies c/p, sob, abd pain, N/V/D, fevers, chills, headache, dizziness, dysuria, blood in urine and stool.

## 2023-07-21 NOTE — ED PROVIDER NOTE - CLINICAL SUMMARY MEDICAL DECISION MAKING FREE TEXT BOX
24 yo M w/ PMHx of traumatic brain injury presents for 1 week of worsening left ear pain with hearing deficits and ringing sensation.  Physical exam remarkable for left ear pain with manipulation, left ear with cerumen completely obstructing tympanic membrane, and erythema floor of ear canal.  Concern for otitis externa versus cerumen impaction.  Low concern for mastoiditis versus acute otitis media as there is no swelling/erythema of the mastoid and there is very clear pain with external ear manipulation.  Plan for attempted softening/removal of cerumen burden with reexamination of tympanic membrane.  Will likely treat patient for otitis externa with Ciprodex and Augmentin as they will cover for acute otitis externa and instrumentation of the ear canal.  We will also give patient instructions to use hydroperoxide to help reduce cerumen burden.

## 2023-07-21 NOTE — ED ADULT NURSE NOTE - ED STAT RN HANDOFF DETAILS
Epidural Block      Patient reassessed immediately prior to procedure    Patient location during procedure: OB  Start Time: 1/25/2023 12:59 AM  Stop Time: 1/25/2023 1:07 AM  Indication:procedure for pain  Performed By  CRNA/CAA: Clotilde Butts CRNA  Preanesthetic Checklist  Completed: patient identified, IV checked, site marked, risks and benefits discussed, surgical consent, monitors and equipment checked, pre-op evaluation and timeout performed  Prep:  Pt Position:sitting  Sterile Tech:cap, gloves, mask and sterile barrier  Prep:chlorhexidine gluconate and isopropyl alcohol  Monitoring:blood pressure monitoring, continuous pulse oximetry and EKG  Epidural Block Procedure:  Location:lumbar  Level:1-2  Loss of Resistance Medium: saline  Loss of Resistance: 5cm  Cath Depth at skin:10 cm  Paresthesia: none  Aspiration:negative  Test Dose:negative  Post Assessment:  Pt Tolerance:patient tolerated the procedure well with no apparent complications and no signs or symtoms of SAB or intravascular injection  Complications:no          
Labor Epidural      Patient location during procedure: OB  Start Time: 1/24/2023 10:35 PM  Stop Time: 1/24/2023 10:41 PM  Performed By  CRNA/CAA: Clotilde Butts CRNA  Preanesthetic Checklist  Completed: patient identified, IV checked, site marked, risks and benefits discussed, surgical consent, monitors and equipment checked, pre-op evaluation and timeout performed  Prep:  Pt Position:sitting  Sterile Tech:gloves, cap, sterile barrier and mask  Prep:chlorhexidine gluconate and isopropyl alcohol  Monitoring:continuous pulse oximetry, EKG and blood pressure monitoring  Epidural Block Procedure:  Approach:midline  Location:L2-L3  Loss of Resistance Medium: saline  Loss of Resistance: 5cm  Cath Depth at skin:10 cm  Paresthesia: none  Aspiration:negative  Test Dose:negative  Number of Attempts: 1  Post Assessment:  Dressing:secured with tape and occlusive dressing applied  Pt Tolerance:patient tolerated the procedure well with no apparent complications  Complications:no          
report given to Khadijah Quinonez RN, oncoming shift

## 2023-07-21 NOTE — ED PROVIDER NOTE - ATTENDING CONTRIBUTION TO CARE
I, Dr. Padmaja Elkins, have personally performed a face to face medical and diagnostic evaluation of the patient. I have discussed with and reviewed the Resident's and/or ACP's and/or Medical/PA/NP student's note and agree with the History, ROS, Physical Exam and MDM unless otherwise indicated. A brief summary of my personal evaluation and impression can be found below.    Agree with above.    MDM: Patient is a 23-year-old male presenting with 1 week of worsening left ear pain.  + Difficulty hearing/tinnitus.  Has been using eardrops unsure of which but does not think that they are antibiotics.  On exam patient with significant cerumen impaction, mild erythema of partially visualized external auditory canal with tenderness with traction of pinna.  A significant amount of cerumen was removed with curette, peroxide applied an additional amount was removed as well but still unable to fully visualize TM.  Will treat for both otitis externa and media and dispo with ENT follow-up.  Patient instructed on home removal of cerumen.  Symptoms are improving, stable for DC.

## 2023-07-21 NOTE — ED PROVIDER NOTE - NSFOLLOWUPINSTRUCTIONS_ED_ALL_ED_FT
You were seen for 2 weeks of worsening ear pain, decreased hearing, and ringing sensation despite using eardrops for 1 month.  Your evaluated with physical exam and an extensive ear exam.  Hydrogen peroxide was used to soften and remove part of the earwax in your canal with limited improvement in symptoms.  Verbal instructions provided, including instructions to return to ED immediately for any new, worsening, or concerning symptoms. Patient and/or family/caregiver endorsed understanding.    Please take the following medications at home:   - Ciprofloxacin–dexamethasone (Ciprodex): 4 drops into left ear every 12 hours for 7 days   - Augmentin (875 mg amoxicillin–125 mg clavulanate) 1 pill every 8 hours for 7 days   - Continue to use hydrogen peroxide to reduce earwax burden as directed    Please follow up with your primary care provider and/or an ENT (otolaryngologist, ear nose and throat doctor) regarding this ED visit.    Thank you for choosing us for your care.

## 2023-07-21 NOTE — ED ADULT NURSE NOTE - NSFALLUNIVINTERV_ED_ALL_ED
Bed/Stretcher in lowest position, wheels locked, appropriate side rails in place/Call bell, personal items and telephone in reach/Instruct patient to call for assistance before getting out of bed/chair/stretcher/Non-slip footwear applied when patient is off stretcher/[place] to call system/Physically safe environment - no spills, clutter or unnecessary equipment/Purposeful proactive rounding/Room/bathroom lighting operational, light cord in reach

## 2023-07-21 NOTE — ED PROVIDER NOTE - PHYSICAL EXAMINATION
GENERAL: no acute distress, mesomorphic body habitus  HEENT: L ear pain w/ manipulation, L ear w/ cerumen completely obscuring tympanic membrane, erythema on floor of ear canal, atraumatic, normocephalic, vision grossly intact, EOMI, no conjunctivitis or discharge, hearing grossly intact, no nasal discharge or epistaxis  CV: regular rate, normal rhythm, normal S1/S2, no murmurs/rubs, no cyanosis  PULM: normal work of breathing, normal O2 saturation on -, clear breath sounds in b/l upper/lower lung fields, no crackles/rales/rhonchi/wheezing  GI: soft/non-tender/nondistended abdomen, no guarding or rebound tenderness, no palpable masses  : no CVA tenderness  NEURO: A&Ox4, follows commands, normal speech, no focal motor or sensory deficits  MSK: no joint tenderness/swelling/erythema, ranging all extremities with no appreciable loss of ROM  EXT: no peripheral edema, no calf tenderness, no redness or swelling  SKIN: warm, dry, and intact, no rashes  PSYCH: appropriate mood and affect

## 2023-07-21 NOTE — ED PROVIDER NOTE - PATIENT PORTAL LINK FT
You can access the FollowMyHealth Patient Portal offered by [facility] by registering at the following website: [email or web address]. By joining [facility]’s FollowMyHealth portal, you will also be able to view your health information using other applications (apps) compatible with our system.

## 2024-04-18 ENCOUNTER — EMERGENCY (EMERGENCY)
Facility: HOSPITAL | Age: 24
LOS: 1 days | Discharge: HOME | End: 2024-04-18
Attending: STUDENT IN AN ORGANIZED HEALTH CARE EDUCATION/TRAINING PROGRAM
Payer: OTHER GOVERNMENT

## 2024-04-18 VITALS
DIASTOLIC BLOOD PRESSURE: 76 MMHG | SYSTOLIC BLOOD PRESSURE: 119 MMHG | HEART RATE: 124 BPM | TEMPERATURE: 98 F | OXYGEN SATURATION: 98 % | RESPIRATION RATE: 20 BRPM

## 2024-04-18 VITALS
OXYGEN SATURATION: 96 % | TEMPERATURE: 98 F | RESPIRATION RATE: 18 BRPM | HEART RATE: 91 BPM | SYSTOLIC BLOOD PRESSURE: 108 MMHG | DIASTOLIC BLOOD PRESSURE: 69 MMHG

## 2024-04-18 DIAGNOSIS — F39 UNSPECIFIED MOOD [AFFECTIVE] DISORDER: ICD-10-CM

## 2024-04-18 LAB
ALBUMIN SERPL ELPH-MCNC: 4.6 G/DL — SIGNIFICANT CHANGE UP (ref 3.3–5)
ALP SERPL-CCNC: 104 U/L — SIGNIFICANT CHANGE UP (ref 40–120)
ALT FLD-CCNC: 36 U/L — SIGNIFICANT CHANGE UP (ref 10–45)
ANION GAP SERPL CALC-SCNC: 13 MMOL/L — SIGNIFICANT CHANGE UP (ref 5–17)
APAP SERPL-MCNC: <15 UG/ML — SIGNIFICANT CHANGE UP (ref 10–30)
APPEARANCE UR: CLEAR — SIGNIFICANT CHANGE UP
AST SERPL-CCNC: 24 U/L — SIGNIFICANT CHANGE UP (ref 10–40)
BACTERIA # UR AUTO: NEGATIVE /HPF — SIGNIFICANT CHANGE UP
BASOPHILS # BLD AUTO: 0.05 K/UL — SIGNIFICANT CHANGE UP (ref 0–0.2)
BASOPHILS NFR BLD AUTO: 0.5 % — SIGNIFICANT CHANGE UP (ref 0–2)
BILIRUB SERPL-MCNC: 0.2 MG/DL — SIGNIFICANT CHANGE UP (ref 0.2–1.2)
BILIRUB UR-MCNC: NEGATIVE — SIGNIFICANT CHANGE UP
BUN SERPL-MCNC: 13 MG/DL — SIGNIFICANT CHANGE UP (ref 7–23)
CALCIUM SERPL-MCNC: 9.8 MG/DL — SIGNIFICANT CHANGE UP (ref 8.4–10.5)
CAST: 0 /LPF — SIGNIFICANT CHANGE UP (ref 0–4)
CHLORIDE SERPL-SCNC: 103 MMOL/L — SIGNIFICANT CHANGE UP (ref 96–108)
CO2 SERPL-SCNC: 24 MMOL/L — SIGNIFICANT CHANGE UP (ref 22–31)
COLOR SPEC: YELLOW — SIGNIFICANT CHANGE UP
CREAT SERPL-MCNC: 1.06 MG/DL — SIGNIFICANT CHANGE UP (ref 0.5–1.3)
DIFF PNL FLD: NEGATIVE — SIGNIFICANT CHANGE UP
EGFR: 99 ML/MIN/1.73M2 — SIGNIFICANT CHANGE UP
EOSINOPHIL # BLD AUTO: 0.05 K/UL — SIGNIFICANT CHANGE UP (ref 0–0.5)
EOSINOPHIL NFR BLD AUTO: 0.5 % — SIGNIFICANT CHANGE UP (ref 0–6)
ETHANOL SERPL-MCNC: <10 MG/DL — SIGNIFICANT CHANGE UP (ref 0–10)
FLUAV AG NPH QL: SIGNIFICANT CHANGE UP
FLUBV AG NPH QL: SIGNIFICANT CHANGE UP
GLUCOSE SERPL-MCNC: 121 MG/DL — HIGH (ref 70–99)
GLUCOSE UR QL: NEGATIVE MG/DL — SIGNIFICANT CHANGE UP
HCT VFR BLD CALC: 39.5 % — SIGNIFICANT CHANGE UP (ref 39–50)
HGB BLD-MCNC: 13.7 G/DL — SIGNIFICANT CHANGE UP (ref 13–17)
IMM GRANULOCYTES NFR BLD AUTO: 0.4 % — SIGNIFICANT CHANGE UP (ref 0–0.9)
KETONES UR-MCNC: NEGATIVE MG/DL — SIGNIFICANT CHANGE UP
LEUKOCYTE ESTERASE UR-ACNC: ABNORMAL
LYMPHOCYTES # BLD AUTO: 1.94 K/UL — SIGNIFICANT CHANGE UP (ref 1–3.3)
LYMPHOCYTES # BLD AUTO: 19.5 % — SIGNIFICANT CHANGE UP (ref 13–44)
MCHC RBC-ENTMCNC: 29.4 PG — SIGNIFICANT CHANGE UP (ref 27–34)
MCHC RBC-ENTMCNC: 34.7 GM/DL — SIGNIFICANT CHANGE UP (ref 32–36)
MCV RBC AUTO: 84.8 FL — SIGNIFICANT CHANGE UP (ref 80–100)
MONOCYTES # BLD AUTO: 0.58 K/UL — SIGNIFICANT CHANGE UP (ref 0–0.9)
MONOCYTES NFR BLD AUTO: 5.8 % — SIGNIFICANT CHANGE UP (ref 2–14)
NEUTROPHILS # BLD AUTO: 7.29 K/UL — SIGNIFICANT CHANGE UP (ref 1.8–7.4)
NEUTROPHILS NFR BLD AUTO: 73.3 % — SIGNIFICANT CHANGE UP (ref 43–77)
NITRITE UR-MCNC: NEGATIVE — SIGNIFICANT CHANGE UP
NRBC # BLD: 0 /100 WBCS — SIGNIFICANT CHANGE UP (ref 0–0)
PH UR: 6.5 — SIGNIFICANT CHANGE UP (ref 5–8)
PLATELET # BLD AUTO: 274 K/UL — SIGNIFICANT CHANGE UP (ref 150–400)
POTASSIUM SERPL-MCNC: 3.9 MMOL/L — SIGNIFICANT CHANGE UP (ref 3.5–5.3)
POTASSIUM SERPL-SCNC: 3.9 MMOL/L — SIGNIFICANT CHANGE UP (ref 3.5–5.3)
PROT SERPL-MCNC: 7.4 G/DL — SIGNIFICANT CHANGE UP (ref 6–8.3)
PROT UR-MCNC: NEGATIVE MG/DL — SIGNIFICANT CHANGE UP
RBC # BLD: 4.66 M/UL — SIGNIFICANT CHANGE UP (ref 4.2–5.8)
RBC # FLD: 12.1 % — SIGNIFICANT CHANGE UP (ref 10.3–14.5)
RBC CASTS # UR COMP ASSIST: 0 /HPF — SIGNIFICANT CHANGE UP (ref 0–4)
RSV RNA NPH QL NAA+NON-PROBE: SIGNIFICANT CHANGE UP
SALICYLATES SERPL-MCNC: <2 MG/DL — LOW (ref 15–30)
SARS-COV-2 RNA SPEC QL NAA+PROBE: SIGNIFICANT CHANGE UP
SODIUM SERPL-SCNC: 140 MMOL/L — SIGNIFICANT CHANGE UP (ref 135–145)
SP GR SPEC: 1.01 — SIGNIFICANT CHANGE UP (ref 1–1.03)
SQUAMOUS # UR AUTO: 0 /HPF — SIGNIFICANT CHANGE UP (ref 0–5)
UROBILINOGEN FLD QL: 0.2 MG/DL — SIGNIFICANT CHANGE UP (ref 0.2–1)
WBC # BLD: 9.95 K/UL — SIGNIFICANT CHANGE UP (ref 3.8–10.5)
WBC # FLD AUTO: 9.95 K/UL — SIGNIFICANT CHANGE UP (ref 3.8–10.5)
WBC UR QL: 93 /HPF — HIGH (ref 0–5)

## 2024-04-18 PROCEDURE — 85025 COMPLETE CBC W/AUTO DIFF WBC: CPT

## 2024-04-18 PROCEDURE — 99285 EMERGENCY DEPT VISIT HI MDM: CPT

## 2024-04-18 PROCEDURE — 87637 SARSCOV2&INF A&B&RSV AMP PRB: CPT

## 2024-04-18 PROCEDURE — 99285 EMERGENCY DEPT VISIT HI MDM: CPT | Mod: 25

## 2024-04-18 PROCEDURE — 70450 CT HEAD/BRAIN W/O DYE: CPT | Mod: MC

## 2024-04-18 PROCEDURE — 80053 COMPREHEN METABOLIC PANEL: CPT

## 2024-04-18 PROCEDURE — 90792 PSYCH DIAG EVAL W/MED SRVCS: CPT | Mod: 95

## 2024-04-18 PROCEDURE — 99053 MED SERV 10PM-8AM 24 HR FAC: CPT

## 2024-04-18 PROCEDURE — 81001 URINALYSIS AUTO W/SCOPE: CPT

## 2024-04-18 PROCEDURE — 70450 CT HEAD/BRAIN W/O DYE: CPT | Mod: 26,MC

## 2024-04-18 PROCEDURE — 93005 ELECTROCARDIOGRAM TRACING: CPT

## 2024-04-18 PROCEDURE — 80307 DRUG TEST PRSMV CHEM ANLYZR: CPT

## 2024-04-18 RX ORDER — ACETAMINOPHEN 500 MG
650 TABLET ORAL ONCE
Refills: 0 | Status: ACTIVE | OUTPATIENT
Start: 2024-04-18 | End: 2024-04-18

## 2024-04-18 NOTE — ED BEHAVIORAL HEALTH PROGRESS NOTE - NSBHATTESTCOMMENTATTENDFT_PSY_A_CORE
agree with above assessment, plan, pt cleared to return home, [place]/marty wolfe feels comforable having pt return home.  No deformity or limitation of movement

## 2024-04-18 NOTE — ED ADULT NURSE NOTE - NSFALLUNIVINTERV_ED_ALL_ED
Bed/Stretcher in lowest position, wheels locked, appropriate side rails in place/Call bell, personal items and telephone in reach/Instruct patient to call for assistance before getting out of bed/chair/stretcher/Non-slip footwear applied when patient is off stretcher/[place] to call system/Physically safe environment - no spills, clutter or unnecessary equipment/Purposeful proactive rounding/Room/bathroom lighting operational, light cord in reach

## 2024-04-18 NOTE — ED BEHAVIORAL HEALTH PROGRESS NOTE - IS SUICIDALITY/HOMICIDALITY RISK SCREENING/ASSESSMENT INCOMPLETE OR NEED TO BE REDONE?
No (assessment complete) Report given to COTY Sosa. Pt to be transferred to endoscopy at this time. No acute distress noted, denies chest pain, no shortness of breath indicated.

## 2024-04-18 NOTE — ED PROVIDER NOTE - CLINICAL SUMMARY MEDICAL DECISION MAKING FREE TEXT BOX
Ross Duncan, ED Attendin-year-old male presenting for psychiatric evaluation for auditory hallucinations and suicidal ideation with plan to jump off a bridge.  On exam, vital signs stable, no focal exam findings.  Patient with tangential speech and flight of ideas.  Plan for psychiatric clearance labs.  Given hallucinations are new, will obtain head CT.  Psychiatry consulted, will see patient.

## 2024-04-18 NOTE — ED ADULT NURSE REASSESSMENT NOTE - GENERAL PATIENT STATE
comfortable appearance/resting/sleeping
comfortable appearance/no change observed/resting/sleeping
comfortable appearance

## 2024-04-18 NOTE — ED BEHAVIORAL HEALTH ASSESSMENT NOTE - RISK ASSESSMENT
Static rf: prior psych hx/admissions, male gender, prior suicide attempts  Modifiable rf: impulsivity, poor coping skills, stress tied to separation from wife  PF: Denies current or recent SI with plan or intent, in good behavioral control, support from family

## 2024-04-18 NOTE — ED BEHAVIORAL HEALTH ASSESSMENT NOTE - HPI (INCLUDE ILLNESS QUALITY, SEVERITY, DURATION, TIMING, CONTEXT, MODIFYING FACTORS, ASSOCIATED SIGNS AND SYMPTOMS)
Pt is a 25 yo M, , has 4 mo daughter, lives with parents and grandparent, unemployed, takes online courses in airport management, PMH significant for TBI, with self-reported psych hx of Bipolar II Disorder, Borderline Personality Disorder, Dissociative Identity Disorder, anxiety, prior admissions, reports ~11 suicide attempts vs interrupted attempts by OD/jump off bridge(last one was a few months ago), +hx of SIB by cutting, active etoh use(reports drinking an unspecified amount of vodka/beer 3-4 x/wk; denies hx of complicated withdrawal; reports last drink was a few days ago), not connected to care or on meds, who presents to the ED BIBA a/b self after reportedly hearing demonic voices last night and having urges to end his life by various ways, including jumping off a bridge, OD, and hanging.     On interview, pt presents as calm and cooperative with no e/o internal preoccupation, states he started to hear demonic voices last night after he left his friend's house, which led to him wanting to kill himself, and activated EMS so he could avoid harming himself. The pt states that despite having these thoughts to kill himself, he knows he would never act on these thoughts, because of his daughter, but states that he has been feeling more depressed and suicidal since last November after learning about his ex-wife's infidelity. He states his depressed mood and SI have worsened over the last 2 months since seeing photos of his ex-wife with her new boyfriend on social media and has been contemplating various ways to end his life, but again denies any recent intent or specific plan. In addition, the pt states he's been having disturbed sleep, poor appetite/energy, and has had VH of "dark shadows". The pt states that despite feeling safe being discharged from the hospital, he is potentially interested in hospitalization to develop more adaptive coping skills for his recent anxiety and depressive symptoms.    Unable to obtain collateral from family.

## 2024-04-18 NOTE — ED ADULT NURSE NOTE - SUICIDE SCREENING QUESTION 2
-- Message is from the [facility]--    Order Request  Lab: A1C    Message / reason:  Please call once orders are in the system.                Preferred Delivery Method   Call when ready for pickup - phone number to notify: [phone]     Caller Information       Type Contact Phone    06/24/2019 09:24 AM Phone (Incoming) Cisco Maria (Self) [phone] (H)          Alternative phone number: n/a    Turnaround time given to caller:   \"This message will be sent to [state Provider's name]. The clinical team will fulfill your request as soon as they review your message.\"  
DM labs ordered-  Pt aware   
Yes

## 2024-04-18 NOTE — ED BEHAVIORAL HEALTH PROGRESS NOTE - CASE SUMMARY/FORMULATION (CLEARLY DOCUMENT RATIONALE FOR DISPOSITION CHANGE)
Pt is a 23 yo M, , has 4 mo daughter, lives with parents and grandparent, unemployed, takes online courses in airport management, PMH significant for TBI, with self-reported psych hx of Bipolar II Disorder, Borderline Personality Disorder, Dissociative Identity Disorder, anxiety, prior admissions, reports ~11 suicide attempts vs interrupted attempts by OD/jump off bridge(last one was a few months ago), +hx of SIB by cutting, active etoh use(reports drinking an unspecified amount of vodka/beer 3-4 x/wk; denies hx of complicated withdrawal; reports last drink was a few days ago), not connected to care or on meds, who presents to the ED BIBA a/b self after reportedly hearing demonic voices last night and having urges to end his life by various ways, including jumping off a bridge, OD, and hanging.     The pt reports numerous psychiatric symptoms over the last several months, including chronic SI, low mood, poor appetite/sleep, and A/VH, in the setting of distress tied to learning about his ex's infidelity and recently finding out she has a new partner. At this time, he expresses potential interest in being admitted to the hospital to develop better coping skills for his distress. However, he states multiple times he has no current or recent intent or plan to kill himself and reports feeling safe to be discharged home. In addition, given his reported hx of BPD, his numerous suicide attempts, and hx of SIB, and that his recent SI and numerous other psychiatric symptoms coincide with his perceived abandonment by his ex, there is suspicion that his current presentation is more consistent with character pathology and not an acute psychiatric condition that would be amenable to inpatient psychiatric hospitalization. However, given that the pt is only partially able to safety plan and that collateral about his recent functioning is unavailable, will recommend holding him in the ED for reassessment and to speak with his parents for additional information.    4/18: On reassessment, pt affect's is stable and appropriate, pt further denies SI/HI, CAH/AH. Agree with previous assessment that psychotic symptoms (CAH and SI) occurring in the context of BPD given ongoing interpersonal stressors specifically with ex-wife. Fully able to safety plan this morning. Spoke to family, not concerned of acute safety risk at this time.     Plan:   - treat and release  - [facility], further given resources for [facility] resources including [facility] and Crisis Center.

## 2024-04-18 NOTE — ED ADULT NURSE NOTE - OBJECTIVE STATEMENT
25 y/o male bib ems for psych eval/s/i ( gotten worse over the past week), as per pt. he was hearing and seeing things, no current medication or treatment, but he was hospitalized @ least 1-2x for the past 2-3 years, admit to hx of suicide attempts, ( about to jump off a ledge), for the past 2 weeks he felt depressed and suicidal. admits to have etoh yesterday, denies any current drug used, but reported smoking cannabis @ least yesterday. [facility] denies any medical/surgical problems.

## 2024-04-18 NOTE — ED PROVIDER NOTE - OBJECTIVE STATEMENT
24-year-old male, w past   Psychiatric history, but unable to clarify an exact underlying diagnosis.  Patient presenting today with chief complaint of suicidal ideations as well as auditory hallucinations.  Patient denies taking any psychiatric medicine daily.  Does endorse plan, states that he was standing on a ledge and plan to jump off a bridge.  Symptoms triggered by recent disagreement related to infidelity in patient's marriage.   No history of suicide attempts. Has had psychiatric admissions in the past at the [facility], patient is ex [employer].  No other complaints at this time.

## 2024-04-18 NOTE — ED BEHAVIORAL HEALTH ASSESSMENT NOTE - SUMMARY
Pt is a 23 yo M, , has 4 mo daughter, lives with parents and grandparent, unemployed, takes online courses in airport management, PMH significant for TBI, with self-reported psych hx of Bipolar II Disorder, Borderline Personality Disorder, Dissociative Identity Disorder, anxiety, prior admissions, reports ~11 suicide attempts vs interrupted attempts by OD/jump off bridge(last one was a few months ago), +hx of SIB by cutting, active etoh use(reports drinking an unspecified amount of vodka/beer 3-4 x/wk; denies hx of complicated withdrawal; reports last drink was a few days ago), not connected to care or on meds, who presents to the ED BIBA a/b self after reportedly hearing demonic voices last night and having urges to end his life by various ways, including jumping off a bridge, OD, and hanging.     The pt reports numerous psychiatric symptoms over the last several months, including chronic SI, low mood, poor appetite/sleep, and A/VH, in the setting of distress tied to learning about his ex's infidelity and recently finding out she has a new partner. At this time, he expresses potential interest in being admitted to the hospital to develop better coping skills for his distress, but he states multiple times he has no current or recent intent or plan to kill himself and reports feeling safe being discharged home. In addition, given his reported psychiatric history, there is suspicion that his heightened distress tied to the perceived abandonment by his ex, his hx o Pt is a 23 yo M, , has 4 mo daughter, lives with parents and grandparent, unemployed, takes online courses in airport management, PMH significant for TBI, with self-reported psych hx of Bipolar II Disorder, Borderline Personality Disorder, Dissociative Identity Disorder, anxiety, prior admissions, reports ~11 suicide attempts vs interrupted attempts by OD/jump off bridge(last one was a few months ago), +hx of SIB by cutting, active etoh use(reports drinking an unspecified amount of vodka/beer 3-4 x/wk; denies hx of complicated withdrawal; reports last drink was a few days ago), not connected to care or on meds, who presents to the ED BIBA a/b self after reportedly hearing demonic voices last night and having urges to end his life by various ways, including jumping off a bridge, OD, and hanging.     The pt reports numerous psychiatric symptoms over the last several months, including chronic SI, low mood, poor appetite/sleep, and A/VH, in the setting of distress tied to learning about his ex's infidelity and recently finding out she has a new partner. At this time, he expresses potential interest in being admitted to the hospital to develop better coping skills for his distress. However, he states multiple times he has no current or recent intent or plan to kill himself and reports feeling safe to be discharged home. In addition, given his reported hx of BPD, his numerous suicide attempts, and hx of SIB, and that his recent SI and numerous other psychiatric symptoms coincide with his perceived abandonment by his ex, there is suspicion that his current presentation is more consistent with character pathology and not an acute psychiatric condition that would be amenable to inpatient psychiatric hospitalization. However, given that the pt is only partially able to safety plan and that collateral about his recent functioning is unavailable, will recommend holding him in the ED for reassessment and to speak with his parents for additional information.

## 2024-04-18 NOTE — ED BEHAVIORAL HEALTH PROGRESS NOTE - DETAILS
[Follow-Up: _____] : a [unfilled] follow-up visit spoke to family, does not have acute safety concern at this time pt able to identify support system, coping skills and professional resources if symptoms re-arise

## 2024-04-18 NOTE — ED PROVIDER NOTE - NSFOLLOWUPCLINICS_GEN_ALL_ED_FT
[facility]iat[facility]  Psychiatry  [address] [place] [address]  Phone: [phone]  Fax:   Follow Up Time: Routine

## 2024-04-18 NOTE — ED PROVIDER NOTE - PATIENT PORTAL LINK FT
You can access the FollowMyHealth Patient Portal offered by [facility] by registering at the following website: [email or web address]. By joining [employer]’s FollowMyHealth portal, you will also be able to view your health information using other applications (apps) compatible with our system.

## 2024-04-18 NOTE — ED PROVIDER NOTE - PHYSICAL EXAMINATION
Const: Well-nourished, Well-developed, appearing stated age.  Eyes: no conjunctival injection, and symmetrical lids.  HEENT: Head NCAT, no lesions. Atraumatic external nose and ears.   Neck: Symmetric, trachea midline.   CVS: +S1/S2, Radial and DP pulses 2+ b/l.   RESP: Unlabored respiratory effort.   GI: Nontender/Nondistended, No CVA tenderness b/l.   MSK: Normocephalic/Atraumatic.   Skin: Warm, dry and intact.   Healed lacerations of the left wrist noted.  Neuro: Fluent Speech. Moving all extremities.   Psych: Awake, Alert, & Oriented (AAO) x3.+Flight of ideas, tangential

## 2024-04-18 NOTE — ED BEHAVIORAL HEALTH PROGRESS NOTE - DETAILS:
Pt calm and cooperative at bedside, thought process is linear. Denies CAH (says that he was hearing voices to hurt and kill himself), says that it went away overnight. Denies VH ("demonic shadows"). Denies S/I/I/P and H/I/I/P at this time though reports a hx of both when he was previously hospitalized when he was in the . Does not believe he needs to be hospitalized at this time. Able to engage in safety planning.  Pt calm and cooperative at bedside, thought process is linear. Denies CAH (says that he was hearing voices to hurt and kill himself), says that it went away overnight. Denies VH ("demonic shadows"). Denies S/I/I/P and H/I/I/P at this time though reports a hx of both when he was previously hospitalized when he was in the . Does not believe he needs to be hospitalized at this time. Able to engage in safety planning. Agreeable for team to speak to mom ([phone]). Pt calm and cooperative at bedside, thought process is linear. Denies CAH (says that he was hearing voices to hurt and kill himself), says that it went away overnight. Denies VH ("demonic shadows"). Denies S/I/I/P and H/I/I/P at this time though reports a hx of both when he was previously hospitalized when he was in the . Does not believe he needs to be hospitalized at this time. Able to engage in safety planning.  spoke to mom([phone]), states pt has not been expressing SI/HI, she has no concenrs for safety, no reent depression, savannah psychosis noted; pt has been drinking a lot of energy drinks, exercise supplements.

## 2024-04-18 NOTE — ED BEHAVIORAL HEALTH ASSESSMENT NOTE - PSYCHIATRIC ISSUES AND PLAN (INCLUDE STANDING AND PRN MEDICATION)
For agitation can offer PO/IM Haldol 5 mg/Ativan 2 mg Q6H PRN. If IM given, obtain a repeat ecg and hold antipsycohtics if QTC>500

## 2024-04-18 NOTE — ED ADULT NURSE REASSESSMENT NOTE - DESCRIPTION
pt spoke to RNKIMANI. Pt discussed his relationship with his parents, and said that things are going into a positive directions (i.e.  parents are listening to him more, mom and dad are open to "changing" their behavior with each other,
Pt was cleared for DC by psychiatry, DC papers and instruction given to pt. Pt was receptive to instructions  and showed positive insight regarding his psychiatric issues. Pt denied current SI. All belongings returned including valuables. Pt left ED at 1216H, ambulatory, alert and oriented x 4 and   via arranged transportation (cab).
Pt asleep, VS obtained. Pt is waiting for psychiatrist evaluation. Maintained patient on constant supervision
Pt is waiting to be assessed by psychiatry

## 2024-06-14 NOTE — ED PROVIDER NOTE - OBJECTIVE STATEMENT
Clinic Note     Current Date:  06/14/24     Subjective:   Reason for Visit: Acute visit  History was provided by the patient and mother.     HPI:   Dominga Vargas is a 11 year old female presenting with abdominal complaints. When patient eats any breads, pastas, pizza, or any other products with wheat/gluten she has diarrhea and abdominal pain, as well as nausea. Started last year and has been getting worse. Hasn't had issues with gluten free foods (e.g. symptoms resolve and stools are normal). Hasn't seen blood in stool. Pain is in lower left usually.    Additionally on chart review did previously have elevated TSH and mom states for a time she was started on medicine but was not able to follow-up (previous pediatrician left practice a few months after that visit).  Mom reports she is often tired as well in the other day took a 3 hour nap.      Review of Systems   All other ROS negative except those stated above.    History reviewed. No pertinent past medical history.    Past Surgical History:   Procedure Laterality Date   • Repair umbilical edwar,<4y/o,reduc           Family History   Problem Relation Age of Onset   • ADHD/ADD Brother         Social History     Social History Narrative   • Not on file        ALLERGIES:  No Known Allergies     Current Outpatient Medications   Medication Sig Dispense Refill   • ondansetron (Zofran) 8 MG tablet Take 8 mg by mouth every 8 hours as needed for Nausea.     • dicyclomine (BENTYL) 10 MG capsule Take 10 mg by mouth 3 times daily as needed.       No current facility-administered medications for this visit.            Objective:   Visit Vitals  /68   Temp 97.9 °F (36.6 °C)   Ht 5' 2.6\" (1.59 m)   Wt 73.8 kg (162 lb 12.8 oz)   LMP 06/07/2024   BMI 29.21 kg/m²        Physical Exam:   GEN:  Alert, interactive, NAD   HEENT:  Normocephalic, atraumatic, sclerae and conjunctivae clear, oropharynx with moist mucous membranes  PULM:  normal respirations, breathing comfortably    CV: normal perfusion, no cyanosis or edema.  Brisk capillary refill.  ABD:  Soft, mildly-tender in lower quadrants, non-distended, without masses or organomegaly   NEURO:  Alert, face symmetric without focal deficits, strength and tone are grossly within normal limits throughout, developmentally age appropriate       Assessment/plan:   Dominga Vargas is a 11 year old female with likely hypothyroidism based on previous elevated TSH and medication use (likely levothyroxine).  Discussed getting repeat levels and will touch base with endocrinology Monday for recommendations and likely will have her see endocrinologist.      Also likely has celiac disease based on history and mom not necessarily wanting to have a scope done.  Discussed can get screening labs and also advised to have her avoid gluten in since she likely does have celiac based on history.  Given ongoing GI complaints and she does sometimes eat gluten discussed getting anemia screening with [facility].    Also due for a couple vaccines which we discussed and were given today without issue.  Due for checkup so discussed we will do her checkup in about 2 months and will do her full physical exam at that time.  Can follow up sooner as needed.    ED Diagnosis   1. Establishing care with new doctor, encounter for        2. Gluten intolerance  Gliadin IgA and IgG Deamidated    Immunoglobulin IgA Quantitative      3. Screening for deficiency anemia  CBC with Automated Differential      4. Abnormal TSH  Thyroid Stimulating Hormone Reflex      5. Need for vaccination  HPV 9 (GARDASIL 9)    MENINGOCOCCAL ACWY (MENVEO) 10Y+             22 yo M w/ PMHx of traumatic brain injury presents for 1 week of worsening left ear pain with hearing deficits and ringing sensation.  Patient has been having ongoing left ear symptoms for over 1 month for which she has been taking eardrops (possibly Debrox).  However, patient continued to have worsening symptoms over the past month and stopped taking the Debrox drops.  The symptoms were preceded by a cough/sore throat 1 month ago.  He has chronic headaches and visual disturbances associate with a traumatic brain injury sustained during  service with left-sided facial trauma.  He denies any new onset neurological symptoms, headaches, chest pain, shortness of breath, /GI symptoms, lightheadedness/dizziness.

## 2024-07-16 ENCOUNTER — EMERGENCY (EMERGENCY)
Facility: HOSPITAL | Age: 24
LOS: 1 days | Discharge: HOME | End: 2024-07-16
Attending: STUDENT IN AN ORGANIZED HEALTH CARE EDUCATION/TRAINING PROGRAM
Payer: OTHER GOVERNMENT

## 2024-07-16 VITALS
RESPIRATION RATE: 16 BRPM | WEIGHT: 210.1 LBS | OXYGEN SATURATION: 98 % | TEMPERATURE: 97 F | HEART RATE: 101 BPM | HEIGHT: 69 IN | SYSTOLIC BLOOD PRESSURE: 128 MMHG | DIASTOLIC BLOOD PRESSURE: 87 MMHG

## 2024-07-16 PROCEDURE — 99285 EMERGENCY DEPT VISIT HI MDM: CPT

## 2024-07-16 PROCEDURE — 99053 MED SERV 10PM-8AM 24 HR FAC: CPT

## 2024-07-17 VITALS
HEART RATE: 54 BPM | RESPIRATION RATE: 16 BRPM | SYSTOLIC BLOOD PRESSURE: 121 MMHG | TEMPERATURE: 99 F | OXYGEN SATURATION: 98 % | DIASTOLIC BLOOD PRESSURE: 74 MMHG

## 2024-07-17 LAB
ALBUMIN SERPL ELPH-MCNC: 4.8 G/DL — SIGNIFICANT CHANGE UP (ref 3.3–5)
ALP SERPL-CCNC: 110 U/L — SIGNIFICANT CHANGE UP (ref 40–120)
ALT FLD-CCNC: 25 U/L — SIGNIFICANT CHANGE UP (ref 10–45)
ANION GAP SERPL CALC-SCNC: 16 MMOL/L — SIGNIFICANT CHANGE UP (ref 5–17)
AST SERPL-CCNC: 25 U/L — SIGNIFICANT CHANGE UP (ref 10–40)
BASE EXCESS BLDV CALC-SCNC: 0 MMOL/L — SIGNIFICANT CHANGE UP (ref -2–3)
BASOPHILS # BLD AUTO: 0.05 K/UL — SIGNIFICANT CHANGE UP (ref 0–0.2)
BASOPHILS NFR BLD AUTO: 0.6 % — SIGNIFICANT CHANGE UP (ref 0–2)
BILIRUB SERPL-MCNC: 0.2 MG/DL — SIGNIFICANT CHANGE UP (ref 0.2–1.2)
BUN SERPL-MCNC: 10 MG/DL — SIGNIFICANT CHANGE UP (ref 7–23)
CA-I SERPL-SCNC: 1.22 MMOL/L — SIGNIFICANT CHANGE UP (ref 1.15–1.33)
CALCIUM SERPL-MCNC: 9.5 MG/DL — SIGNIFICANT CHANGE UP (ref 8.4–10.5)
CHLORIDE BLDV-SCNC: 108 MMOL/L — SIGNIFICANT CHANGE UP (ref 96–108)
CHLORIDE SERPL-SCNC: 106 MMOL/L — SIGNIFICANT CHANGE UP (ref 96–108)
CO2 BLDV-SCNC: 27 MMOL/L — HIGH (ref 22–26)
CO2 SERPL-SCNC: 21 MMOL/L — LOW (ref 22–31)
CREAT SERPL-MCNC: 1.1 MG/DL — SIGNIFICANT CHANGE UP (ref 0.5–1.3)
EGFR: 95 ML/MIN/1.73M2 — SIGNIFICANT CHANGE UP
EOSINOPHIL # BLD AUTO: 0.23 K/UL — SIGNIFICANT CHANGE UP (ref 0–0.5)
EOSINOPHIL NFR BLD AUTO: 2.8 % — SIGNIFICANT CHANGE UP (ref 0–6)
GAS PNL BLDV: 139 MMOL/L — SIGNIFICANT CHANGE UP (ref 136–145)
GAS PNL BLDV: SIGNIFICANT CHANGE UP
GAS PNL BLDV: SIGNIFICANT CHANGE UP
GLUCOSE BLDV-MCNC: 96 MG/DL — SIGNIFICANT CHANGE UP (ref 70–99)
GLUCOSE SERPL-MCNC: 95 MG/DL — SIGNIFICANT CHANGE UP (ref 70–99)
HCO3 BLDV-SCNC: 25 MMOL/L — SIGNIFICANT CHANGE UP (ref 22–29)
HCT VFR BLD CALC: 40.9 % — SIGNIFICANT CHANGE UP (ref 39–50)
HCT VFR BLDA CALC: 42 % — SIGNIFICANT CHANGE UP (ref 39–51)
HGB BLD CALC-MCNC: 14 G/DL — SIGNIFICANT CHANGE UP (ref 12.6–17.4)
HGB BLD-MCNC: 14 G/DL — SIGNIFICANT CHANGE UP (ref 13–17)
HOROWITZ INDEX BLDV+IHG-RTO: SIGNIFICANT CHANGE UP
IMM GRANULOCYTES NFR BLD AUTO: 0.2 % — SIGNIFICANT CHANGE UP (ref 0–0.9)
LACTATE BLDV-MCNC: 1.6 MMOL/L — SIGNIFICANT CHANGE UP (ref 0.5–2)
LIDOCAIN IGE QN: 48 U/L — SIGNIFICANT CHANGE UP (ref 7–60)
LYMPHOCYTES # BLD AUTO: 2.71 K/UL — SIGNIFICANT CHANGE UP (ref 1–3.3)
LYMPHOCYTES # BLD AUTO: 33.2 % — SIGNIFICANT CHANGE UP (ref 13–44)
MCHC RBC-ENTMCNC: 29.7 PG — SIGNIFICANT CHANGE UP (ref 27–34)
MCHC RBC-ENTMCNC: 34.2 GM/DL — SIGNIFICANT CHANGE UP (ref 32–36)
MCV RBC AUTO: 86.8 FL — SIGNIFICANT CHANGE UP (ref 80–100)
MONOCYTES # BLD AUTO: 0.5 K/UL — SIGNIFICANT CHANGE UP (ref 0–0.9)
MONOCYTES NFR BLD AUTO: 6.1 % — SIGNIFICANT CHANGE UP (ref 2–14)
NEUTROPHILS # BLD AUTO: 4.65 K/UL — SIGNIFICANT CHANGE UP (ref 1.8–7.4)
NEUTROPHILS NFR BLD AUTO: 57.1 % — SIGNIFICANT CHANGE UP (ref 43–77)
NRBC # BLD: 0 /100 WBCS — SIGNIFICANT CHANGE UP (ref 0–0)
PCO2 BLDV: 43 MMHG — SIGNIFICANT CHANGE UP (ref 42–55)
PH BLDV: 7.38 — SIGNIFICANT CHANGE UP (ref 7.32–7.43)
PLATELET # BLD AUTO: 285 K/UL — SIGNIFICANT CHANGE UP (ref 150–400)
PO2 BLDV: 63 MMHG — HIGH (ref 25–45)
POTASSIUM BLDV-SCNC: 3.9 MMOL/L — SIGNIFICANT CHANGE UP (ref 3.5–5.1)
POTASSIUM SERPL-MCNC: 3.8 MMOL/L — SIGNIFICANT CHANGE UP (ref 3.5–5.3)
POTASSIUM SERPL-SCNC: 3.8 MMOL/L — SIGNIFICANT CHANGE UP (ref 3.5–5.3)
PROT SERPL-MCNC: 7.5 G/DL — SIGNIFICANT CHANGE UP (ref 6–8.3)
RBC # BLD: 4.71 M/UL — SIGNIFICANT CHANGE UP (ref 4.2–5.8)
RBC # FLD: 12.1 % — SIGNIFICANT CHANGE UP (ref 10.3–14.5)
SAO2 % BLDV: 92.2 % — HIGH (ref 67–88)
SODIUM SERPL-SCNC: 143 MMOL/L — SIGNIFICANT CHANGE UP (ref 135–145)
WBC # BLD: 8.16 K/UL — SIGNIFICANT CHANGE UP (ref 3.8–10.5)
WBC # FLD AUTO: 8.16 K/UL — SIGNIFICANT CHANGE UP (ref 3.8–10.5)

## 2024-07-17 PROCEDURE — 82947 ASSAY GLUCOSE BLOOD QUANT: CPT

## 2024-07-17 PROCEDURE — 83605 ASSAY OF LACTIC ACID: CPT

## 2024-07-17 PROCEDURE — 84132 ASSAY OF SERUM POTASSIUM: CPT

## 2024-07-17 PROCEDURE — 96375 TX/PRO/DX INJ NEW DRUG ADDON: CPT

## 2024-07-17 PROCEDURE — 85018 HEMOGLOBIN: CPT

## 2024-07-17 PROCEDURE — 74177 CT ABD & PELVIS W/CONTRAST: CPT | Mod: 26,MC

## 2024-07-17 PROCEDURE — 99284 EMERGENCY DEPT VISIT MOD MDM: CPT | Mod: 25

## 2024-07-17 PROCEDURE — 82435 ASSAY OF BLOOD CHLORIDE: CPT

## 2024-07-17 PROCEDURE — 82330 ASSAY OF CALCIUM: CPT

## 2024-07-17 PROCEDURE — 80053 COMPREHEN METABOLIC PANEL: CPT

## 2024-07-17 PROCEDURE — 84295 ASSAY OF SERUM SODIUM: CPT

## 2024-07-17 PROCEDURE — 74177 CT ABD & PELVIS W/CONTRAST: CPT | Mod: MC

## 2024-07-17 PROCEDURE — 85025 COMPLETE CBC W/AUTO DIFF WBC: CPT

## 2024-07-17 PROCEDURE — 82803 BLOOD GASES ANY COMBINATION: CPT

## 2024-07-17 PROCEDURE — 83690 ASSAY OF LIPASE: CPT

## 2024-07-17 PROCEDURE — 85014 HEMATOCRIT: CPT

## 2024-07-17 PROCEDURE — 96376 TX/PRO/DX INJ SAME DRUG ADON: CPT

## 2024-07-17 PROCEDURE — 96374 THER/PROPH/DIAG INJ IV PUSH: CPT | Mod: XU

## 2024-07-17 RX ORDER — SODIUM CHLORIDE 0.9 % (FLUSH) 0.9 %
1000 SYRINGE (ML) INJECTION ONCE
Refills: 0 | Status: COMPLETED | OUTPATIENT
Start: 2024-07-17 | End: 2024-07-17

## 2024-07-17 RX ORDER — KETOROLAC TROMETHAMINE 30 MG/ML
15 INJECTION, SOLUTION INTRAMUSCULAR ONCE
Refills: 0 | Status: DISCONTINUED | OUTPATIENT
Start: 2024-07-17 | End: 2024-07-17

## 2024-07-17 RX ORDER — ONDANSETRON HYDROCHLORIDE 2 MG/ML
4 INJECTION INTRAMUSCULAR; INTRAVENOUS ONCE
Refills: 0 | Status: COMPLETED | OUTPATIENT
Start: 2024-07-17 | End: 2024-07-17

## 2024-07-17 RX ADMIN — Medication 1000 MILLILITER(S): at 03:28

## 2024-07-17 RX ADMIN — KETOROLAC TROMETHAMINE 15 MILLIGRAM(S): 30 INJECTION, SOLUTION INTRAMUSCULAR at 03:26

## 2024-07-17 RX ADMIN — ONDANSETRON HYDROCHLORIDE 4 MILLIGRAM(S): 2 INJECTION INTRAMUSCULAR; INTRAVENOUS at 03:26

## 2024-07-17 RX ADMIN — KETOROLAC TROMETHAMINE 15 MILLIGRAM(S): 30 INJECTION, SOLUTION INTRAMUSCULAR at 06:20

## 2024-10-10 ENCOUNTER — EMERGENCY (EMERGENCY)
Facility: HOSPITAL | Age: 24
LOS: 1 days | Discharge: HOME | End: 2024-10-10
Attending: EMERGENCY MEDICINE | Admitting: EMERGENCY MEDICINE
Payer: OTHER GOVERNMENT

## 2024-10-10 VITALS
HEART RATE: 79 BPM | HEIGHT: 68 IN | DIASTOLIC BLOOD PRESSURE: 86 MMHG | SYSTOLIC BLOOD PRESSURE: 130 MMHG | OXYGEN SATURATION: 98 % | TEMPERATURE: 99 F | RESPIRATION RATE: 18 BRPM | WEIGHT: 210.1 LBS

## 2024-10-10 PROCEDURE — 99285 EMERGENCY DEPT VISIT HI MDM: CPT

## 2024-10-10 PROCEDURE — 99053 MED SERV 10PM-8AM 24 HR FAC: CPT

## 2024-10-10 PROCEDURE — 93010 ELECTROCARDIOGRAM REPORT: CPT

## 2024-10-10 NOTE — ED ADULT TRIAGE NOTE - CHIEF COMPLAINT QUOTE
c/o abdominal pain x4 months, worsening this week with palpitations and chest pain (had endo and colonoscopy last week). Phx asthma, parasite infection

## 2024-10-11 VITALS
HEART RATE: 83 BPM | RESPIRATION RATE: 17 BRPM | DIASTOLIC BLOOD PRESSURE: 83 MMHG | TEMPERATURE: 98 F | SYSTOLIC BLOOD PRESSURE: 124 MMHG | OXYGEN SATURATION: 99 %

## 2024-10-11 LAB
ALBUMIN SERPL ELPH-MCNC: 4.6 G/DL — SIGNIFICANT CHANGE UP (ref 3.3–5)
ALP SERPL-CCNC: 117 U/L — SIGNIFICANT CHANGE UP (ref 40–120)
ALT FLD-CCNC: 29 U/L — SIGNIFICANT CHANGE UP (ref 4–41)
ANION GAP SERPL CALC-SCNC: 11 MMOL/L — SIGNIFICANT CHANGE UP (ref 7–14)
APPEARANCE UR: CLEAR — SIGNIFICANT CHANGE UP
APTT BLD: 36.3 SEC — HIGH (ref 24.5–35.6)
AST SERPL-CCNC: 25 U/L — SIGNIFICANT CHANGE UP (ref 4–40)
BACTERIA # UR AUTO: NEGATIVE /HPF — SIGNIFICANT CHANGE UP
BASOPHILS # BLD AUTO: 0.05 K/UL — SIGNIFICANT CHANGE UP (ref 0–0.2)
BASOPHILS NFR BLD AUTO: 0.6 % — SIGNIFICANT CHANGE UP (ref 0–2)
BILIRUB SERPL-MCNC: 0.2 MG/DL — SIGNIFICANT CHANGE UP (ref 0.2–1.2)
BILIRUB UR-MCNC: NEGATIVE — SIGNIFICANT CHANGE UP
BUN SERPL-MCNC: 13 MG/DL — SIGNIFICANT CHANGE UP (ref 7–23)
CALCIUM SERPL-MCNC: 9.5 MG/DL — SIGNIFICANT CHANGE UP (ref 8.4–10.5)
CAST: 0 /LPF — SIGNIFICANT CHANGE UP (ref 0–4)
CHLORIDE SERPL-SCNC: 104 MMOL/L — SIGNIFICANT CHANGE UP (ref 98–107)
CO2 SERPL-SCNC: 26 MMOL/L — SIGNIFICANT CHANGE UP (ref 22–31)
COLOR SPEC: YELLOW — SIGNIFICANT CHANGE UP
CREAT SERPL-MCNC: 1.12 MG/DL — SIGNIFICANT CHANGE UP (ref 0.5–1.3)
DIFF PNL FLD: NEGATIVE — SIGNIFICANT CHANGE UP
EGFR: 93 ML/MIN/1.73M2 — SIGNIFICANT CHANGE UP
EOSINOPHIL # BLD AUTO: 0.21 K/UL — SIGNIFICANT CHANGE UP (ref 0–0.5)
EOSINOPHIL NFR BLD AUTO: 2.5 % — SIGNIFICANT CHANGE UP (ref 0–6)
GAS PNL BLDV: SIGNIFICANT CHANGE UP
GLUCOSE SERPL-MCNC: 99 MG/DL — SIGNIFICANT CHANGE UP (ref 70–99)
GLUCOSE UR QL: NEGATIVE MG/DL — SIGNIFICANT CHANGE UP
HCT VFR BLD CALC: 40.1 % — SIGNIFICANT CHANGE UP (ref 39–50)
HGB BLD-MCNC: 13.7 G/DL — SIGNIFICANT CHANGE UP (ref 13–17)
IANC: 3.8 K/UL — SIGNIFICANT CHANGE UP (ref 1.8–7.4)
IMM GRANULOCYTES NFR BLD AUTO: 0.2 % — SIGNIFICANT CHANGE UP (ref 0–0.9)
INR BLD: 0.94 RATIO — SIGNIFICANT CHANGE UP (ref 0.85–1.16)
KETONES UR-MCNC: NEGATIVE MG/DL — SIGNIFICANT CHANGE UP
LEUKOCYTE ESTERASE UR-ACNC: NEGATIVE — SIGNIFICANT CHANGE UP
LIDOCAIN IGE QN: 35 U/L — SIGNIFICANT CHANGE UP (ref 7–60)
LYMPHOCYTES # BLD AUTO: 3.73 K/UL — HIGH (ref 1–3.3)
LYMPHOCYTES # BLD AUTO: 44.5 % — HIGH (ref 13–44)
MCHC RBC-ENTMCNC: 29.2 PG — SIGNIFICANT CHANGE UP (ref 27–34)
MCHC RBC-ENTMCNC: 34.2 GM/DL — SIGNIFICANT CHANGE UP (ref 32–36)
MCV RBC AUTO: 85.5 FL — SIGNIFICANT CHANGE UP (ref 80–100)
MONOCYTES # BLD AUTO: 0.57 K/UL — SIGNIFICANT CHANGE UP (ref 0–0.9)
MONOCYTES NFR BLD AUTO: 6.8 % — SIGNIFICANT CHANGE UP (ref 2–14)
NEUTROPHILS # BLD AUTO: 3.8 K/UL — SIGNIFICANT CHANGE UP (ref 1.8–7.4)
NEUTROPHILS NFR BLD AUTO: 45.4 % — SIGNIFICANT CHANGE UP (ref 43–77)
NITRITE UR-MCNC: NEGATIVE — SIGNIFICANT CHANGE UP
NRBC # BLD: 0 /100 WBCS — SIGNIFICANT CHANGE UP (ref 0–0)
NRBC # FLD: 0 K/UL — SIGNIFICANT CHANGE UP (ref 0–0)
PH UR: 6 — SIGNIFICANT CHANGE UP (ref 5–8)
PLATELET # BLD AUTO: 288 K/UL — SIGNIFICANT CHANGE UP (ref 150–400)
POTASSIUM SERPL-MCNC: 4 MMOL/L — SIGNIFICANT CHANGE UP (ref 3.5–5.3)
POTASSIUM SERPL-SCNC: 4 MMOL/L — SIGNIFICANT CHANGE UP (ref 3.5–5.3)
PROT SERPL-MCNC: 7.1 G/DL — SIGNIFICANT CHANGE UP (ref 6–8.3)
PROT UR-MCNC: NEGATIVE MG/DL — SIGNIFICANT CHANGE UP
PROTHROM AB SERPL-ACNC: 11.2 SEC — SIGNIFICANT CHANGE UP (ref 9.9–13.4)
RBC # BLD: 4.69 M/UL — SIGNIFICANT CHANGE UP (ref 4.2–5.8)
RBC # FLD: 12.3 % — SIGNIFICANT CHANGE UP (ref 10.3–14.5)
RBC CASTS # UR COMP ASSIST: 0 /HPF — SIGNIFICANT CHANGE UP (ref 0–4)
SODIUM SERPL-SCNC: 141 MMOL/L — SIGNIFICANT CHANGE UP (ref 135–145)
SP GR SPEC: 1.06 — HIGH (ref 1–1.03)
SQUAMOUS # UR AUTO: 0 /HPF — SIGNIFICANT CHANGE UP (ref 0–5)
UROBILINOGEN FLD QL: 0.2 MG/DL — SIGNIFICANT CHANGE UP (ref 0.2–1)
WBC # BLD: 8.38 K/UL — SIGNIFICANT CHANGE UP (ref 3.8–10.5)
WBC # FLD AUTO: 8.38 K/UL — SIGNIFICANT CHANGE UP (ref 3.8–10.5)
WBC UR QL: 1 /HPF — SIGNIFICANT CHANGE UP (ref 0–5)

## 2024-10-11 PROCEDURE — 73060 X-RAY EXAM OF HUMERUS: CPT | Mod: 26,LT

## 2024-10-11 PROCEDURE — 73560 X-RAY EXAM OF KNEE 1 OR 2: CPT | Mod: 26,LT

## 2024-10-11 PROCEDURE — 74177 CT ABD & PELVIS W/CONTRAST: CPT | Mod: 26,MC

## 2024-10-11 PROCEDURE — 73090 X-RAY EXAM OF FOREARM: CPT | Mod: 26,LT

## 2024-10-11 PROCEDURE — 73070 X-RAY EXAM OF ELBOW: CPT | Mod: 26,LT

## 2024-10-11 RX ORDER — SODIUM CHLORIDE 0.9 % (FLUSH) 0.9 %
1000 SYRINGE (ML) INJECTION ONCE
Refills: 0 | Status: COMPLETED | OUTPATIENT
Start: 2024-10-11 | End: 2024-10-11

## 2024-10-11 RX ORDER — ACETAMINOPHEN 325 MG
1000 TABLET ORAL ONCE
Refills: 0 | Status: COMPLETED | OUTPATIENT
Start: 2024-10-11 | End: 2024-10-11

## 2024-10-11 RX ADMIN — Medication 1000 MILLILITER(S): at 01:51

## 2024-10-11 RX ADMIN — Medication 400 MILLIGRAM(S): at 01:51

## 2024-10-11 NOTE — ED PROVIDER NOTE - NSFOLLOWUPCLINICS_GEN_ALL_ED_FT
Gastroenterology at [facility]  Gastroenterology  [address] [place] [address]  Phone: [phone]  Fax:     Gastroenterology at [place]  Gastroenterology  [address] [place] [address]  Phone: [phone]  Fax: [phone]

## 2024-10-11 NOTE — ED PROVIDER NOTE - PHYSICAL EXAMINATION
Physical Exam:  Gen:  uncomfortable appearing, awake, alert,   Head: NCAT  HEENT: Normal conjunctiva, trachea midline, moist mucous membranes  Lung:  no respiratory distress, speaking in full sentences  CV: RRR, no murmurs, rubs or gallops  Abd: ttp luq and llq  MSK: No visible deformities, moves all four extremities,   Neuro: No focal motor deficits  Cranial Nerves:  --CN II: PERRL 3mm  --CN III, IV, VI: EOMI bilateral no nystagmus  --CN V1-3: Facial sensation intact to touch  --CN VII: No facial asymmetry or droop  --CN VIII: Hearing intact to rubbing fingers b/l  --CN IX, X: Palate elevates symmetrically. Normal phonation  --CN XI: Heading turning and shoulder shrug intact b/l  --CN XII: Tongue midline with normal movements  Skin: Warm, well perfused,   Psych: appropriate affect and mood Physical Exam:  Gen:  uncomfortable appearing, awake, alert,   Head: NCAT  HEENT: Normal conjunctiva, trachea midline, moist mucous membranes  Lung:  no respiratory distress, speaking in full sentences  CV: RRR, no murmurs, rubs or gallops  Abd: ttp luq and llq  MSK: No visible deformities, moves all four extremities,   Neuro: No focal motor deficits  Cranial Nerves:  --CN II: PERRL 3mm  --CN III, IV, VI: EOMI bilateral no nystagmus  --CN V1-3: Facial sensation intact to touch  --CN VII: No facial asymmetry or droop  --CN VIII: Hearing intact to rubbing fingers b/l  --CN IX, X: Palate elevates symmetrically. Normal phonation  --CN XI: Heading turning and shoulder shrug intact b/l  --CN XII: Tongue midline with normal movements  Skin: Warm, well perfused,   Psych: appropriate affect and mood  ATTENDING PHYSICAL EXAM  GEN - NAD; well appearing; A+O x3  HEAD - NC/AT; EYES/NOSE - PERRL, EOMI, mucous membranes moist, no discharge; THROAT: Oral cavity and pharynx normal. No inflammation, swelling, exudate, or lesions  NECK: Neck supple, non-tender without lymphadenopathy, no masses, no JVD  PULMONARY - CTA b/l, symmetric breath sounds, no w/r/r  CARDIAC -s1s2, RRR, no M,R,G  ABDOMEN - +NABS, ND, left sided TTP.  No pelvic instability.  BACK - no CVA tenderness, No vertebral or paravertebral tenderness  EXTREMITIES - + left elbow TTP and with movement.  No obvious deformity.  Distal PMS intact.  SKIN - no rash or bruising   NEUROLOGIC - alert, CN 2-12 intact, no aphasia or dysarthria, sensation to light touch nl, coordination nl, finger to nose nl, romberg negative, motor 5/5 RUE/LUE/RLE/LLE/EHL/Plantar flexion, no pronator drift, no lateralizing features, gait nl

## 2024-10-11 NOTE — ED PROVIDER NOTE - NSFOLLOWUPINSTRUCTIONS_ED_ALL_ED_FT
You have been evaluated in the Emergency Department today for abdominal pain. Your evaluation did not show evidence of medical conditions requiring emergent intervention at this time.    Please schedule an appointment with your primary care physician.    Return to the Emergency Department if you experience worsening or uncontrolled pain, fevers 100.4°F or greater, recurrent vomiting, inability to tolerate food or fluids by mouth, bloody stools or vomit, black or tarry stools, or any other concerning symptoms.

## 2024-10-11 NOTE — ED PROVIDER NOTE - OBJECTIVE STATEMENT
24-year-old male past medical history of anxiety presents to the ED for multiple medical complaints.  Patient is endorsing abdominal pain x 5 months. he has seen a GI and was told he had "parasites" but never started on treatment. He states the GI specialist said he wanted to wait for results of colonoscopy/endoscopy prior to starting treatment. He has that done a month after but has not followed up for results yet.  He is also endorsing an episode of syncope that occurred earlier today at the gym. Pt was lifting weights and suddenly synopsized. he states a minute or two went by. He is endorsing left elbow and knee pain. he denies any chest pain, sob, ha, dizziness, diarrhea. 24-year-old male past medical history of anxiety presents to the ED for multiple medical complaints.  Patient is endorsing abdominal pain x 5 months. he has seen a GI and was told he had "parasites" but never started on treatment. He states the GI specialist said he wanted to wait for results of colonoscopy/endoscopy prior to starting treatment. He has that done a month after but has not followed up for results yet.  He is also endorsing an episode of syncope that occurred earlier today at the gym. Pt was lifting weights and suddenly syncopized. he states a minute or two went by. He is endorsing left elbow and knee pain. he denies any chest pain, sob, ha, dizziness, diarrhea.  Attending - Agree with above.  I evaluated patient myself. 23 y/o M c/o feeling lightheaded while curling with dumbells in gym.  Fell to his knees and having pain in left elbow.  Also reports having abd pains for past few months.

## 2024-10-11 NOTE — ED ADULT NURSE REASSESSMENT NOTE - NS ED NURSE REASSESS COMMENT FT1
Pt lying in stretcher, not in acute distress, denies pain at this time. Respirations are even and unlabored. IV removed. Urine collected and sent. Bed in lowest position, comfort measures provided, safety maintained.

## 2024-10-11 NOTE — ED PROVIDER NOTE - CLINICAL SUMMARY MEDICAL DECISION MAKING FREE TEXT BOX
24-year-old male past medical history of anxiety presents to the ED for multiple medical complaints.  Patient is endorsing abdominal pain x 5 months. he has seen a GI and was told he had "parasites" but never started on treatment. He states the GI specialist said he wanted to wait for results of colonoscopy/endoscopy prior to starting treatment. He has that done a month after but has not followed up for results yet.  He is also endorsing an episode of syncope that occurred earlier today at the gym. Pt was lifting weights and suddenly synopsized. he states a minute or two went by. He is endorsing left elbow and knee pain. he denies any chest pain, sob, ha, dizziness, diarrhea.  will eval with xrays to r/o fractures/dislocations, labs including lipase, and ct a/p for any acute process.

## 2024-10-11 NOTE — ED ADULT NURSE NOTE - OBJECTIVE STATEMENT
pt received to rm 24 a  , a&ox4 , ambulatory , phx of asthma  , p/w abd pain x  4 months worsening today  . Pt breathing even and unlabored on room air.  Denies fever, chills, cough, SOB, chest pain, palpitations, dizziness, nausea, vomiting, diarrhea, constipation, numbness, tingling. IV placed. Labs collected and sent. pt educated on fall precautions and confirms understanding via teach back method. Stretcher locked in lowest position with siderails up x2. Personal items within reach.

## 2024-10-11 NOTE — ED PROVIDER NOTE - PATIENT PORTAL LINK FT
You can access the FollowMyHealth Patient Portal offered by [facility] by registering at the following website: [email or web address]. By joining [employer]’s FollowMyHealth portal, you will also be able to view your health information using other applications (apps) compatible with our system.

## 2024-11-22 ENCOUNTER — EMERGENCY (EMERGENCY)
Facility: HOSPITAL | Age: 24
LOS: 1 days | Discharge: HOME | End: 2024-11-22
Attending: EMERGENCY MEDICINE | Admitting: EMERGENCY MEDICINE
Payer: OTHER MISCELLANEOUS

## 2024-11-22 VITALS
HEART RATE: 65 BPM | DIASTOLIC BLOOD PRESSURE: 70 MMHG | RESPIRATION RATE: 15 BRPM | SYSTOLIC BLOOD PRESSURE: 109 MMHG | OXYGEN SATURATION: 99 % | TEMPERATURE: 99 F

## 2024-11-22 VITALS
RESPIRATION RATE: 15 BRPM | HEART RATE: 67 BPM | HEIGHT: 68 IN | SYSTOLIC BLOOD PRESSURE: 135 MMHG | DIASTOLIC BLOOD PRESSURE: 80 MMHG | TEMPERATURE: 98 F | WEIGHT: 214.95 LBS | OXYGEN SATURATION: 99 %

## 2024-11-22 PROCEDURE — 93010 ELECTROCARDIOGRAM REPORT: CPT

## 2024-11-22 PROCEDURE — 73630 X-RAY EXAM OF FOOT: CPT | Mod: 26,RT

## 2024-11-22 PROCEDURE — 99053 MED SERV 10PM-8AM 24 HR FAC: CPT

## 2024-11-22 PROCEDURE — 99284 EMERGENCY DEPT VISIT MOD MDM: CPT

## 2024-11-22 PROCEDURE — 73610 X-RAY EXAM OF ANKLE: CPT | Mod: 26,RT

## 2024-11-22 RX ORDER — ACETAMINOPHEN 500 MG
975 TABLET ORAL ONCE
Refills: 0 | Status: COMPLETED | OUTPATIENT
Start: 2024-11-22 | End: 2024-11-22

## 2024-11-22 RX ORDER — IBUPROFEN 200 MG
600 TABLET ORAL ONCE
Refills: 0 | Status: COMPLETED | OUTPATIENT
Start: 2024-11-22 | End: 2024-11-22

## 2024-11-22 RX ADMIN — Medication 975 MILLIGRAM(S): at 09:09

## 2024-11-22 RX ADMIN — Medication 600 MILLIGRAM(S): at 09:35

## 2024-11-22 NOTE — ED ADULT NURSE NOTE - OBJECTIVE STATEMENT
pt A&Ox4 to ED c/o 10/10 right foot pain/trauma. Pt was at work and a pallet of sodas fell on his foot. ambulatory, R toe is stiff but moveable. denies chest pain, dizziness, blurry vison, n/v, sob. respirations even and unlabored. no acute distress noted at this time. pending x-ray. plan of care continued.

## 2024-11-22 NOTE — ED ADULT NURSE NOTE - NSFALLUNIVINTERV_ED_ALL_ED
Bed/Stretcher in lowest position, wheels locked, appropriate side rails in place/Call bell, personal items and telephone in reach/Instruct patient to call for assistance before getting out of bed/chair/stretcher/Non-slip footwear applied when patient is off stretcher/[place] to call system/Physically safe environment - no spills, clutter or unnecessary equipment/Purposeful proactive rounding/Room/bathroom lighting operational, light cord in reach

## 2024-11-22 NOTE — ED ADULT NURSE REASSESSMENT NOTE - NS ED NURSE REASSESS COMMENT FT1
pt A&Ox4, resting in stretcher. pending discharge. no acute distress noted at this time. plan of care continued.

## 2024-11-22 NOTE — ED PROVIDER NOTE - PATIENT PORTAL LINK FT
You can access the FollowMyHealth Patient Portal offered by [facility] by registering at the following website: [email or web address]. By joining [employer]’s FollowMyHealth portal, you will also be able to view your health information using other applications (apps) compatible with our system.

## 2024-11-22 NOTE — ED PROVIDER NOTE - NSFOLLOWUPINSTRUCTIONS_ED_ALL_ED_FT
You have been evaluated in the Emergency Department today for right foot pain. Your evaluation did not show evidence of medical conditions requiring emergent intervention at this time.    For pain, you can take TYLENOL/ACETAMINOPHEN up to 4,000mg a day for your symptoms in four divided doses and MOTRIN/IBUPROFEN up to 2,400mg a day in four divided doses (for up to 5 days with food).    Please keep the foot elevated when you are not walking around. You can also put ice on the area.     Please schedule an appointment with your primary care physician within 2-3 days.    Return to the Emergency Department if you experience uncontrolled pain, or any other concerning symptoms.    Thank you for choosing us for your care.

## 2024-11-22 NOTE — ED PROVIDER NOTE - CLINICAL SUMMARY MEDICAL DECISION MAKING FREE TEXT BOX
24 male past medical history anxiety, SIBO presenting with traumatic right foot pain.  Patient says that yesterday, a pallet containing stacked bottles of water and other clear beverages fell on his right foot.  Patient has been able to walk but with pain.  Says that he has a pulsating pain in his right toes, worse in the right first toe.  Has some numbness and tingling when the pain is severe.  Denies weakness.  Has not taken anything for pain.  Vitals nonactionable.  On exam: Patient sitting in bed, in no acute distress.  Bilateral dorsalis pedis pulses 2+.  Sensation to light touch intact bilaterally.  Full ROM of the right ankle.  Patient wiggling toes.  Tenderness to palpation over the 1st through 5th toes and distal foot.  No ecchymosis, edema, lesions.  Heart regular rate and rhythm.  Lungs with bilateral breath sounds.  Abdomen soft and nondistended.  Differential diagnosis includes but not limited to: Contusion, fracture.  Plan: X-rays of the foot and ankle, pain control.  Will reassess. 24 male past medical history anxiety, SIBO presenting with traumatic right foot pain.  Patient says that yesterday, a pallet containing stacked bottles of water and other clear beverages fell on his right foot.  Patient has been able to walk but with pain.  Says that he has a pulsating pain in his right toes, worse in the right first toe.  Has some numbness and tingling when the pain is severe.  Denies weakness.  Has not taken anything for pain.  Vitals nonactionable.  On exam: Patient sitting in bed, in no acute distress.  Bilateral dorsalis pedis pulses 2+.  Sensation to light touch intact bilaterally.  Full ROM of the right ankle.  Patient wiggling toes.  Tenderness to palpation from the mid foot through the 1st to 5th toes.  No ecchymosis, edema, lesions.  Heart regular rate and rhythm.  Lungs with bilateral breath sounds.  Abdomen soft and nondistended.  Differential diagnosis includes but not limited to: Contusion, fracture.  Plan: X-rays of the foot and ankle, pain control.  Will reassess.

## 2024-11-22 NOTE — PROVIDER CONTACT NOTE (OTHER) - SITUATION
Per provider, patient is medically cleared for discharge. Patient is requesting metro card to return to home. Patient is AOx4 and ambulatory.

## 2024-11-22 NOTE — ED PROVIDER NOTE - ATTENDING CONTRIBUTION TO CARE
Healthy male p/w R foot pain after heavy pallet fell on his foot now with pain on weight bearing and TTP mostly midfoot and medial metatarsal, no TTP to lateral metatarsals. Neurovasc intact, XR r.o fx

## 2024-11-26 RX ORDER — IBUPROFEN 200 MG
1 TABLET ORAL
Qty: 21 | Refills: 0
Start: 2024-11-26 | End: 2024-12-02

## 2024-11-26 RX ORDER — ACETAMINOPHEN 500 MG
2 TABLET ORAL
Qty: 56 | Refills: 0
Start: 2024-11-26 | End: 2024-12-02

## 2024-11-26 NOTE — ED POST DISCHARGE NOTE - ADDITIONAL DOCUMENTATION
Pt called ED requesting work clearance note, pt informed he will need to see PMD or orthopedics for formal work clearance. His injury was work related and he is trying to go through workers compensation for further assistance. He is currnetly able to bear weight but does have pain with prolonged standing and walking. RX sent for high dose ibuprofen and tylenol

## 2024-11-27 ENCOUNTER — NON-APPOINTMENT (OUTPATIENT)
Age: 24
End: 2024-11-27

## 2025-02-03 NOTE — ED PROVIDER NOTE - PMH
Palliative Supportive Care  met with patient/family to continue to provide emotional support and guidance.      Updated biopsychosocial information relevant to support:     The patient was identified by name and date of birth. Dominick was informed that this is a telemedicine visit and that the visit is being conducted through the Epic Embedded platform. They agree to proceed..  My office door was closed. No one else was in the room. They acknowledged consent and understanding of privacy and security of the video platform. The patient has agreed to participate and understands they can discontinue the visit at any time.    Dominick spoke about being exhausted and overwhelmed by everything going on in his life. He said they have gotten through all the initial consults and his wife will be starting chemo and radiation this week. He said that he feels embarrassed by his limited energy and mobility. He spoke about just walking through the hospital taking all of his energy. LCSW provided emotional support and encouraged Dominick to challenge some of his negative thinking about himself. LCSW reminded him that he is also still recovering from surgery and his MRSA infection and he must be patient with himself. He said he is trying to be strong but he is extremely sad about his wife's illness. LCSW provided emotional support and validated his experience. He said that he is having trouble sleeping. LCSW provided some recommendations and techniques to support with sleep but he stated he had tried most of these and they were not helpful. He also does not want to try sleep medications because he wants to be alert.       Identified areas of need include: emotional support    Resources provided:    Areas that need future follow-up include: reduce anxiety/depressed mood, challenging negative thoughts, sleep hygiene     I have spent 40 minutes with Patient  today in which greater than 50% of this time was spent in  counseling/coordination of care     Palliative  will follow-up as requested by patient, family, and primary team.  Please contact with any specific requests     No pertinent past medical history

## 2025-02-06 ENCOUNTER — EMERGENCY (EMERGENCY)
Facility: HOSPITAL | Age: 25
LOS: 1 days | Discharge: LEFT BEFORE DISCHARGE | End: 2025-02-06
Attending: EMERGENCY MEDICINE
Payer: OTHER GOVERNMENT

## 2025-02-06 VITALS
HEART RATE: 88 BPM | OXYGEN SATURATION: 98 % | WEIGHT: 218.04 LBS | HEIGHT: 68 IN | RESPIRATION RATE: 20 BRPM | SYSTOLIC BLOOD PRESSURE: 118 MMHG | DIASTOLIC BLOOD PRESSURE: 78 MMHG | TEMPERATURE: 98 F

## 2025-02-06 LAB
ALBUMIN SERPL ELPH-MCNC: 4.6 G/DL — SIGNIFICANT CHANGE UP (ref 3.3–5)
ALP SERPL-CCNC: 97 U/L — SIGNIFICANT CHANGE UP (ref 40–120)
ALT FLD-CCNC: 22 U/L — SIGNIFICANT CHANGE UP (ref 10–45)
ANION GAP SERPL CALC-SCNC: 12 MMOL/L — SIGNIFICANT CHANGE UP (ref 5–17)
ANION GAP SERPL CALC-SCNC: 13 MMOL/L — SIGNIFICANT CHANGE UP (ref 5–17)
APPEARANCE UR: CLEAR — SIGNIFICANT CHANGE UP
APTT BLD: 35.4 SEC — SIGNIFICANT CHANGE UP (ref 24.5–35.6)
AST SERPL-CCNC: 28 U/L — SIGNIFICANT CHANGE UP (ref 10–40)
BACTERIA # UR AUTO: NEGATIVE /HPF — SIGNIFICANT CHANGE UP
BASOPHILS # BLD AUTO: 0.01 K/UL — SIGNIFICANT CHANGE UP (ref 0–0.2)
BASOPHILS NFR BLD AUTO: 0.2 % — SIGNIFICANT CHANGE UP (ref 0–2)
BILIRUB SERPL-MCNC: 0.2 MG/DL — SIGNIFICANT CHANGE UP (ref 0.2–1.2)
BILIRUB UR-MCNC: NEGATIVE — SIGNIFICANT CHANGE UP
BUN SERPL-MCNC: 12 MG/DL — SIGNIFICANT CHANGE UP (ref 7–23)
BUN SERPL-MCNC: 12 MG/DL — SIGNIFICANT CHANGE UP (ref 7–23)
CALCIUM SERPL-MCNC: 8.2 MG/DL — LOW (ref 8.4–10.5)
CALCIUM SERPL-MCNC: 9.2 MG/DL — SIGNIFICANT CHANGE UP (ref 8.4–10.5)
CAST: 0 /LPF — SIGNIFICANT CHANGE UP (ref 0–4)
CHLORIDE SERPL-SCNC: 102 MMOL/L — SIGNIFICANT CHANGE UP (ref 96–108)
CHLORIDE SERPL-SCNC: 105 MMOL/L — SIGNIFICANT CHANGE UP (ref 96–108)
CO2 SERPL-SCNC: 22 MMOL/L — SIGNIFICANT CHANGE UP (ref 22–31)
CO2 SERPL-SCNC: 24 MMOL/L — SIGNIFICANT CHANGE UP (ref 22–31)
COLOR SPEC: YELLOW — SIGNIFICANT CHANGE UP
CREAT SERPL-MCNC: 1.22 MG/DL — SIGNIFICANT CHANGE UP (ref 0.5–1.3)
CREAT SERPL-MCNC: 1.34 MG/DL — HIGH (ref 0.5–1.3)
DIFF PNL FLD: NEGATIVE — SIGNIFICANT CHANGE UP
EGFR: 75 ML/MIN/1.73M2 — SIGNIFICANT CHANGE UP
EGFR: 84 ML/MIN/1.73M2 — SIGNIFICANT CHANGE UP
EOSINOPHIL # BLD AUTO: 0.14 K/UL — SIGNIFICANT CHANGE UP (ref 0–0.5)
EOSINOPHIL NFR BLD AUTO: 2.6 % — SIGNIFICANT CHANGE UP (ref 0–6)
FLUAV AG NPH QL: DETECTED
FLUBV AG NPH QL: SIGNIFICANT CHANGE UP
GLUCOSE SERPL-MCNC: 70 MG/DL — SIGNIFICANT CHANGE UP (ref 70–99)
GLUCOSE SERPL-MCNC: 94 MG/DL — SIGNIFICANT CHANGE UP (ref 70–99)
GLUCOSE UR QL: NEGATIVE MG/DL — SIGNIFICANT CHANGE UP
HCT VFR BLD CALC: 40.9 % — SIGNIFICANT CHANGE UP (ref 39–50)
HGB BLD-MCNC: 13.4 G/DL — SIGNIFICANT CHANGE UP (ref 13–17)
IMM GRANULOCYTES NFR BLD AUTO: 0.4 % — SIGNIFICANT CHANGE UP (ref 0–0.9)
INR BLD: 1.1 RATIO — SIGNIFICANT CHANGE UP (ref 0.85–1.16)
KETONES UR-MCNC: NEGATIVE MG/DL — SIGNIFICANT CHANGE UP
LEUKOCYTE ESTERASE UR-ACNC: NEGATIVE — SIGNIFICANT CHANGE UP
LYMPHOCYTES # BLD AUTO: 1.72 K/UL — SIGNIFICANT CHANGE UP (ref 1–3.3)
LYMPHOCYTES # BLD AUTO: 32.1 % — SIGNIFICANT CHANGE UP (ref 13–44)
MCHC RBC-ENTMCNC: 28.3 PG — SIGNIFICANT CHANGE UP (ref 27–34)
MCHC RBC-ENTMCNC: 32.8 G/DL — SIGNIFICANT CHANGE UP (ref 32–36)
MCV RBC AUTO: 86.5 FL — SIGNIFICANT CHANGE UP (ref 80–100)
MONOCYTES # BLD AUTO: 0.61 K/UL — SIGNIFICANT CHANGE UP (ref 0–0.9)
MONOCYTES NFR BLD AUTO: 11.4 % — SIGNIFICANT CHANGE UP (ref 2–14)
NEUTROPHILS # BLD AUTO: 2.85 K/UL — SIGNIFICANT CHANGE UP (ref 1.8–7.4)
NEUTROPHILS NFR BLD AUTO: 53.3 % — SIGNIFICANT CHANGE UP (ref 43–77)
NITRITE UR-MCNC: NEGATIVE — SIGNIFICANT CHANGE UP
NRBC # BLD: 0 /100 WBCS — SIGNIFICANT CHANGE UP (ref 0–0)
NRBC BLD-RTO: 0 /100 WBCS — SIGNIFICANT CHANGE UP (ref 0–0)
PH UR: 6.5 — SIGNIFICANT CHANGE UP (ref 5–8)
PLATELET # BLD AUTO: 253 K/UL — SIGNIFICANT CHANGE UP (ref 150–400)
POTASSIUM SERPL-MCNC: 3.6 MMOL/L — SIGNIFICANT CHANGE UP (ref 3.5–5.3)
POTASSIUM SERPL-MCNC: 3.8 MMOL/L — SIGNIFICANT CHANGE UP (ref 3.5–5.3)
POTASSIUM SERPL-SCNC: 3.6 MMOL/L — SIGNIFICANT CHANGE UP (ref 3.5–5.3)
POTASSIUM SERPL-SCNC: 3.8 MMOL/L — SIGNIFICANT CHANGE UP (ref 3.5–5.3)
PROT SERPL-MCNC: 7.5 G/DL — SIGNIFICANT CHANGE UP (ref 6–8.3)
PROT UR-MCNC: NEGATIVE MG/DL — SIGNIFICANT CHANGE UP
PROTHROM AB SERPL-ACNC: 12.5 SEC — SIGNIFICANT CHANGE UP (ref 9.9–13.4)
RBC # BLD: 4.73 M/UL — SIGNIFICANT CHANGE UP (ref 4.2–5.8)
RBC # FLD: 12 % — SIGNIFICANT CHANGE UP (ref 10.3–14.5)
RBC CASTS # UR COMP ASSIST: 0 /HPF — SIGNIFICANT CHANGE UP (ref 0–4)
RSV RNA NPH QL NAA+NON-PROBE: SIGNIFICANT CHANGE UP
SARS-COV-2 RNA SPEC QL NAA+PROBE: SIGNIFICANT CHANGE UP
SODIUM SERPL-SCNC: 138 MMOL/L — SIGNIFICANT CHANGE UP (ref 135–145)
SODIUM SERPL-SCNC: 140 MMOL/L — SIGNIFICANT CHANGE UP (ref 135–145)
SP GR SPEC: 1.02 — SIGNIFICANT CHANGE UP (ref 1–1.03)
SQUAMOUS # UR AUTO: 0 /HPF — SIGNIFICANT CHANGE UP (ref 0–5)
TROPONIN T, HIGH SENSITIVITY RESULT: <6 NG/L — SIGNIFICANT CHANGE UP (ref 0–51)
UROBILINOGEN FLD QL: 0.2 MG/DL — SIGNIFICANT CHANGE UP (ref 0.2–1)
WBC # BLD: 5.35 K/UL — SIGNIFICANT CHANGE UP (ref 3.8–10.5)
WBC # FLD AUTO: 5.35 K/UL — SIGNIFICANT CHANGE UP (ref 3.8–10.5)
WBC UR QL: 0 /HPF — SIGNIFICANT CHANGE UP (ref 0–5)

## 2025-02-06 PROCEDURE — 99223 1ST HOSP IP/OBS HIGH 75: CPT

## 2025-02-06 PROCEDURE — 71046 X-RAY EXAM CHEST 2 VIEWS: CPT | Mod: 26

## 2025-02-06 RX ORDER — BACTERIOSTATIC SODIUM CHLORIDE 0.9 %
1000 VIAL (ML) INJECTION ONCE
Refills: 0 | Status: COMPLETED | OUTPATIENT
Start: 2025-02-06 | End: 2025-02-06

## 2025-02-06 RX ORDER — ACETAMINOPHEN, DIPHENHYDRAMINE HCL, PHENYLEPHRINE HCL 325; 25; 5 MG/1; MG/1; MG/1
5 TABLET ORAL ONCE
Refills: 0 | Status: COMPLETED | OUTPATIENT
Start: 2025-02-06 | End: 2025-02-06

## 2025-02-06 RX ORDER — DIPHENHYDRAMINE HCL 25 MG
50 CAPSULE ORAL ONCE
Refills: 0 | Status: DISCONTINUED | OUTPATIENT
Start: 2025-02-06 | End: 2025-02-10

## 2025-02-06 RX ORDER — FAMOTIDINE 10 MG/ML
20 INJECTION INTRAVENOUS ONCE
Refills: 0 | Status: COMPLETED | OUTPATIENT
Start: 2025-02-06 | End: 2025-02-06

## 2025-02-06 RX ORDER — METHYLPREDNISOLONE ACETATE 40 MG/ML
40 VIAL (ML) INJECTION ONCE
Refills: 0 | Status: DISCONTINUED | OUTPATIENT
Start: 2025-02-06 | End: 2025-02-10

## 2025-02-06 RX ORDER — ONDANSETRON 4 MG/1
4 TABLET, ORALLY DISINTEGRATING ORAL ONCE
Refills: 0 | Status: COMPLETED | OUTPATIENT
Start: 2025-02-06 | End: 2025-02-06

## 2025-02-06 RX ORDER — ACETAMINOPHEN 160 MG/5ML
975 SUSPENSION ORAL EVERY 6 HOURS
Refills: 0 | Status: DISCONTINUED | OUTPATIENT
Start: 2025-02-06 | End: 2025-02-10

## 2025-02-06 RX ORDER — ACETAMINOPHEN 160 MG/5ML
1000 SUSPENSION ORAL ONCE
Refills: 0 | Status: COMPLETED | OUTPATIENT
Start: 2025-02-06 | End: 2025-02-06

## 2025-02-06 RX ADMIN — ONDANSETRON 4 MILLIGRAM(S): 4 TABLET, ORALLY DISINTEGRATING ORAL at 18:28

## 2025-02-06 RX ADMIN — ACETAMINOPHEN, DIPHENHYDRAMINE HCL, PHENYLEPHRINE HCL 5 MILLIGRAM(S): 325; 25; 5 TABLET ORAL at 21:07

## 2025-02-06 RX ADMIN — ACETAMINOPHEN 400 MILLIGRAM(S): 160 SUSPENSION ORAL at 15:50

## 2025-02-06 RX ADMIN — FAMOTIDINE 20 MILLIGRAM(S): 10 INJECTION INTRAVENOUS at 15:50

## 2025-02-06 RX ADMIN — Medication 1000 MILLILITER(S): at 18:28

## 2025-02-06 RX ADMIN — Medication 1000 MILLILITER(S): at 15:50

## 2025-02-06 NOTE — ED CDU PROVIDER DISPOSITION NOTE - PATIENT PORTAL LINK FT
You can access the FollowMyHealth Patient Portal offered by [facility] by registering at the following website: [email or web address]. By joining [employer]’s FollowMyHealth portal, you will also be able to view your health information using other applications (apps) compatible with our system.

## 2025-02-06 NOTE — ED ADULT NURSE NOTE - OBJECTIVE STATEMENT
Patient has been informed of below referral approval.  She will call back to schedule.    25 yo male with pmh asthma, PTSD presents to ED c/o cp x 1 month intermittently, now occurring daily x 2 weeks. Pt also reports body aches, fevers/chills, n/v/d, cough, lightheadedness, and burning with urination x 1 week. Pt a&ox3, breathing spontaneous and unlabored, santo and following commands. Pt placed on MD ANDREA at bedside. Pt safety measures in place and comfort provided.

## 2025-02-06 NOTE — ED PROVIDER NOTE - PHYSICAL EXAMINATION
GEN: NAD, awake, eyes open spontaneously  EYES: normal conjunctiva, perrl  ENT: NCAT, MMM, Trachea midline  CHEST/LUNGS: Non-tachypneic, CTAB, bilateral breath sounds. Coughing with inspiration   CARDIAC: Non-tachycardic, normal perfusion  ABDOMEN: Soft, epigastric and LUQ tenderness to palpation, No rebound/guarding  MSK: No edema, no gross deformity of extremities  SKIN: No rashes, no petechiae, no vesicles  NEURO: CN grossly intact, normal coordination, no focal motor or sensory deficits  PSYCH: Alert, appropriate, cooperative, with capacity and insight

## 2025-02-06 NOTE — ED PROVIDER NOTE - NS ED ATTENDING STATEMENT MOD
Normal vision: sees adequately in most situations; can see medication labels, newsprint Attending with

## 2025-02-06 NOTE — ED CDU PROVIDER DISPOSITION NOTE - NSFOLLOWUPINSTRUCTIONS_ED_ALL_ED_FT
1. Follow up with your PCP in 1-2 days. If you do not have a PCP you may follow up with our general internal medicine clinic ([phone]) for continued care.        Additionally please follow up with your cardiologist  You may follow-up in the cardiology clinic ([phone]) within 2-3 days.     2. Show copies of your reports given to you.       Take all of your other medications as previously prescribed.     3. Please return to the ED immediately if you develop any worsening or continued chest pain, shortness of breath, palpitations, weakness, nausea/vomiting, lightheadedness, or for any other concerning symptoms. 1. Stay hydrated.    2. Continue your current home medications.    3. Follow-up with your medical doctor in 2-3 days or our medicine clinic [phone].  Bring your test results with you for follow-up. Follow up with [facility] next week, call for appointment [phone]. You did not have your cardiac CT scan as you signed out against medical advice.     4. Return to the ER if you have any worsening symptoms, chest pain, difficulty breathing, fevers, chills, weakness, or any other concerns.

## 2025-02-06 NOTE — ED PROVIDER NOTE - OBJECTIVE STATEMENT
24M current smoker with no pmhx presents to the ED complaining of chest pain x 1 month, intermittent, but now occurring daily over the past 2 weeks. States he feels like someone is hitting him with a sledge hammer. Associated body aches, nausea, intermittent vomiting, fever, chills, cough with sputum. States he last vomited last night, nonbloody. Patient states URI symptoms and fever started approx 1 week ago. Associated decreased PO intake. Denies recent travel or sick contacts. Denies family hx of CAD/MI. No immobilization, recent surgeries, hemoptysis, unilateral leg swelling, or hx of PE/DVT. Also reporting dysuria for the past week, no hematuria, testicular pain or swelling, penile discharge or lesions. Not currently sexually active. No prior abdominal surgeries.

## 2025-02-06 NOTE — ED CDU PROVIDER INITIAL DAY NOTE - ATTENDING APP SHARED VISIT CONTRIBUTION OF CARE
Attending MD Levy: I personally have seen and examined this patient.  Resident note reviewed and agree on plan of care and except where noted.  See below for details.     seen in [place] 55.5    24M with  no reported contributory PMH/PSH/Meds, presents to the ED with chest pain for at least a month intermittently, could be months.  Reports over the last two weeks chest pain has been happening daily.  Reports nausea, vomiting, fevers, chills, cough occasionally productive, body aches.  Reports URI symptoms began about a week ago, since then decreased po intake.  Denies recent travel, sick contacts.  Denies FHx of cardiac disease.  Denies ever tobacco.  Reports dysuria for a week, denies hematuria, frequency, urgency, denies testicular pain, penile discharge.  Denies sexually active.    Exam:   General: calm  HENT: head NCAT, airway patent  Eyes: anicteric, no conjunctival injection   Lungs: lungs CTAB with good inspiratory effort, no wheezing, no rhonchi, no rales  Cardiac: +S1S2, no obvious m/r/g  GI: abdomen soft with +BS, NT, ND  : no CVAT  MSK: ranging neck and extremities freely  Neuro: moving all extremities spontaneously, nonfocal  Psych: normal mood and affect     A/P: 24M with suspected viral illness, will obtain viral swab, given chest pain for >1 mo, will also eval for ACS, PNA, will obtain labs, CXR, will give IVFs, Tylenol, will keep on monitor, EKG, will likely keep in CDU for CT coronary    CDU ADDENDUM: Tele, echo, CT coronary, frequent reasessment

## 2025-02-06 NOTE — ED PROVIDER NOTE - CLINICAL SUMMARY MEDICAL DECISION MAKING FREE TEXT BOX
24M otherwise healthy presenting with multiple medical complaints, including chest pain with URI symptoms such as cough, sore throat, body aches, as well as dysuria, abdominal discomfort and vomiting. Patient nontoxic appearing, vitals WNL, afebrile. Patient with clear breath sounds, but coughing during exam, and mild epigastric tenderness to palpation. Ddx includes viral illness such as flu/covid, gastroenteritis, gastritis, UTI, less likely pneumonia or ACS. Plan for labs, viral swab, CXR, ECG, pain control, IVF and reassess.

## 2025-02-06 NOTE — ED CDU PROVIDER DISPOSITION NOTE - CLINICAL COURSE
24M current smoker with no pmhx presents to the ED complaining of chest pain x 1 month, intermittent, but now occurring daily over the past 2 weeks. States he feels like someone is hitting him with a sledge hammer. Associated body aches, nausea, intermittent vomiting, fever, chills, cough with sputum. States he last vomited last night, nonbloody. Patient states URI symptoms and fever started approx 1 week ago. Associated decreased PO intake. Denies recent travel or sick contacts. Denies family hx of CAD/MI. No immobilization, recent surgeries, hemoptysis, unilateral leg swelling, or hx of PE/DVT. Also reporting dysuria for the past week, no hematuria, testicular pain or swelling, penile discharge or lesions. Not currently sexually active. No prior abdominal surgeries.  ED Course: Flu A + (sxs present >2 wks not candidate for tamiflu), Cr 1.34 baseline 1.1-1.2, troponin <6, CXR with no acute pathology. pt sent to CDU for tele monitoring, CTC.  CDU Course: ___ 24M current smoker with no pmhx presents to the ED complaining of chest pain x 1 month, intermittent, but now occurring daily over the past 2 weeks. States he feels like someone is hitting him with a sledge hammer. Associated body aches, nausea, intermittent vomiting, fever, chills, cough with sputum. States he last vomited last night, nonbloody. Patient states URI symptoms and fever started approx 1 week ago. Associated decreased PO intake. Denies recent travel or sick contacts. Denies family hx of CAD/MI. No immobilization, recent surgeries, hemoptysis, unilateral leg swelling, or hx of PE/DVT. Also reporting dysuria for the past week, no hematuria, testicular pain or swelling, penile discharge or lesions. Not currently sexually active. No prior abdominal surgeries.  ED Course: Flu A + (sxs present >2 wks not candidate for tamiflu), Cr 1.34 baseline 1.1-1.2, troponin <6, CXR with no acute pathology. pt sent to CDU for tele monitoring, CTC.  CDU Course: echo was nonactionable. pt left against medical advice.

## 2025-02-06 NOTE — ED CDU PROVIDER INITIAL DAY NOTE - OBJECTIVE STATEMENT
24M current smoker with no pmhx presents to the ED complaining of chest pain x 1 month, intermittent, but now occurring daily over the past 2 weeks. States he feels like someone is hitting him with a sledge hammer. Associated body aches, nausea, intermittent vomiting, fever, chills, cough with sputum. States he last vomited last night, nonbloody. Patient states URI symptoms and fever started approx 1 week ago. Associated decreased PO intake. Denies recent travel or sick contacts. Denies family hx of CAD/MI. No immobilization, recent surgeries, hemoptysis, unilateral leg swelling, or hx of PE/DVT. Also reporting dysuria for the past week, no hematuria, testicular pain or swelling, penile discharge or lesions. Not currently sexually active. No prior abdominal surgeries.  ED Course: Flu A +, Cr 1.34 baseline 1.1-1.2, troponin <6, CXR with no acute pathology. pt sent to CDU for tele monitoring, CTC.

## 2025-02-06 NOTE — ED ADULT NURSE NOTE - EXPLANATION OF PATIENT'S REASON FOR LEAVING
Pt do not want wait for Protocoled  CTC which will takes place tomorrow  as pt is allergic to IV contrast Dye

## 2025-02-06 NOTE — ED CDU PROVIDER DISPOSITION NOTE - ATTENDING APP SHARED VISIT CONTRIBUTION OF CARE
Attending MD Levy: I personally have seen and examined this patient.  Resident note reviewed and agree on plan of care and except where noted.  See below for details.     seen in [place] 55.5    24M with  no reported contributory PMH/PSH/Meds, presents to the ED with chest pain for at least a month intermittently, could be months.  Found to have +flu.  Reports persistent similar chest pain.  Denies shortness of breath.      Exam:   General: calm  HENT: head NCAT, airway patent  Eyes: anicteric, no conjunctival injection   Lungs: lungs CTAB with good inspiratory effort, no wheezing, no rhonchi, no rales  Cardiac: +S1S2, no obvious m/r/g  GI: abdomen soft with +BS, NT, ND  : no CVAT  MSK: ranging neck and extremities freely  Neuro: moving all extremities spontaneously, nonfocal  Psych: normal mood and affect     A/P: 24M with flu, reviewed available labs and imaging with patient.  Patient wanted to leave AMA.  AMA reviewd with patient.  Paperwork complete.

## 2025-02-06 NOTE — ED CDU PROVIDER INITIAL DAY NOTE - PROGRESS NOTE DETAILS
Call from [facility] supervisor Ruby Hickey asking about patient's disposition. Spoke with patient who was OK with me speaking with the [facility] regarding his care. Information given. - Patti Banerjee PA-C

## 2025-02-06 NOTE — ED PROVIDER NOTE - NSFOLLOWUPINSTRUCTIONS_ED_ALL_ED_FT
You tested positive for Flu A.     YOU WERE SEEN IN THE ED FOR: Nausea, vomiting, diarrhea     You likely have a viral gastroenteritis.     Rest and hydrate with plenty of fluids. Take small sips frequently. Supplement with electrolytes (pedialyte, liquid IV) if unable to tolerate food. Slowly advance your diet. Avoid dairy, spicy, or acidic foods until symptoms improve.     YOU WERE PRESCRIBED: Zofran   You may take up to every 8 hours as needed for nausea/vomiting.   FOLLOW THE INSTRUCTIONS ON THE LABEL/CONTAINER    FOR PAIN/FEVER, YOU MAY TAKE TYLENOL (acetaminophen) 1,000mg AND/OR IBUPROFEN (advil or motrin) 600mg every 6 hours as needed. FOLLOW THE INSTRUCTIONS ON THE LABEL/CONTAINER.    PLEASE FOLLOW UP WITH YOUR PRIVATE PHYSICIAN WITHIN THE NEXT 48 HOURS. BRING COPIES OF YOUR RESULTS.    RETURN TO THE EMERGENCY DEPARTMENT IF YOU EXPERIENCE ANY NEW/CONCERNING/WORSENING SYMPTOMS SUCH AS BUT NOT LIMITED TO: worsening chest pain, difficulty breathing, persistent vomiting, blood in stool or vomit, persistent fever, worsening abdominal pain, or any other concerns.

## 2025-02-06 NOTE — ED ADULT NURSE REASSESSMENT NOTE - RESPIRATORY ASSESSMENT
Problem: MOBILITY - ADULT  Goal: Maintain or return to baseline ADL function  Description: INTERVENTIONS:  -  Assess patient's ability to carry out ADLs; assess patient's baseline for ADL function and identify physical deficits which impact ability to perform ADLs (bathing, care of mouth/teeth, toileting, grooming, dressing, etc )  - Assess/evaluate cause of self-care deficits   - Assess range of motion  - Assess patient's mobility; develop plan if impaired  - Assess patient's need for assistive devices and provide as appropriate  - Encourage maximum independence but intervene and supervise when necessary  - Involve family in performance of ADLs  - Assess for home care needs following discharge   - Consider OT consult to assist with ADL evaluation and planning for discharge  - Provide patient education as appropriate  Outcome: Progressing  Goal: Maintains/Returns to pre admission functional level  Description: INTERVENTIONS:  - Perform BMAT or MOVE assessment daily    - Set and communicate daily mobility goal to care team and patient/family/caregiver  - Collaborate with rehabilitation services on mobility goals if consulted  - Perform Range of Motion 3 times a day  - Reposition patient every 2 hours    - Dangle patient 3 times a day  - Stand patient 3 times a day  - Ambulate patient 3 times a day  - Out of bed to chair 3 times a day   - Out of bed for meals 3 times a day  - Out of bed for toileting  - Record patient progress and toleration of activity level   Outcome: Progressing     Problem: Prexisting or High Potential for Compromised Skin Integrity  Goal: Skin integrity is maintained or improved  Description: INTERVENTIONS:  - Identify patients at risk for skin breakdown  - Assess and monitor skin integrity  - Assess and monitor nutrition and hydration status  - Monitor labs   - Assess for incontinence   - Turn and reposition patient  - Assist with mobility/ambulation  - Relieve pressure over bony prominences  - Avoid friction and shearing  - Provide appropriate hygiene as needed including keeping skin clean and dry  - Evaluate need for skin moisturizer/barrier cream  - Collaborate with interdisciplinary team   - Patient/family teaching  - Consider wound care consult   Outcome: Progressing - - -

## 2025-02-06 NOTE — ED PROVIDER NOTE - ATTENDING CONTRIBUTION TO CARE
Attending MD Levy: I personally have seen and examined this patient.  Resident note reviewed and agree on plan of care and except where noted.  See below for details.     seen in [place] 55.5    24M with  no reported contributory PMH/PSH/Meds, presents to the ED with chest pain for at least a month intermittently, could be months.  Reports over the last two weeks chest pain has been happening daily.  Reports nausea, vomiting, fevers, chills, cough occasionally productive, body aches.  Reports URI symptoms began about a week ago, since then decreased po intake.  Denies recent travel, sick contacts.  Denies FHx of cardiac disease.  Denies ever tobacco.  Reports dysuria for a week, denies hematuria, frequency, urgency, denies testicular pain, penile discharge.  Denies sexually active.    Exam:   General: calm  HENT: head NCAT, airway patent  Eyes: anicteric, no conjunctival injection   Lungs: lungs CTAB with good inspiratory effort, no wheezing, no rhonchi, no rales  Cardiac: +S1S2, no obvious m/r/g  GI: abdomen soft with +BS, NT, ND  : no CVAT  MSK: ranging neck and extremities freely  Neuro: moving all extremities spontaneously, nonfocal  Psych: normal mood and affect     A/P: 24M with suspected viral illness, will obtain viral swab, given chest pain for >1 mo, will also eval for ACS, PNA, will obtain labs, CXR, will give IVFs, Tylenol, will keep on monitor, EKG, will likely keep in CDU for CT coronary

## 2025-02-07 ENCOUNTER — RESULT REVIEW (OUTPATIENT)
Age: 25
End: 2025-02-07

## 2025-02-07 VITALS
TEMPERATURE: 99 F | SYSTOLIC BLOOD PRESSURE: 104 MMHG | DIASTOLIC BLOOD PRESSURE: 68 MMHG | RESPIRATION RATE: 18 BRPM | HEART RATE: 79 BPM | OXYGEN SATURATION: 97 %

## 2025-02-07 LAB
A1C WITH ESTIMATED AVERAGE GLUCOSE RESULT: 5.7 % — HIGH (ref 4–5.6)
CHOLEST SERPL-MCNC: 131 MG/DL — SIGNIFICANT CHANGE UP
CULTURE RESULTS: SIGNIFICANT CHANGE UP
ESTIMATED AVERAGE GLUCOSE: 117 MG/DL — HIGH (ref 68–114)
HDLC SERPL-MCNC: 31 MG/DL — LOW
LIPID PNL WITH DIRECT LDL SERPL: 85 MG/DL — SIGNIFICANT CHANGE UP
NON HDL CHOLESTEROL: 100 MG/DL — SIGNIFICANT CHANGE UP
SPECIMEN SOURCE: SIGNIFICANT CHANGE UP
TRIGL SERPL-MCNC: 79 MG/DL — SIGNIFICANT CHANGE UP

## 2025-02-07 PROCEDURE — 93005 ELECTROCARDIOGRAM TRACING: CPT

## 2025-02-07 PROCEDURE — G0378: CPT

## 2025-02-07 PROCEDURE — 83036 HEMOGLOBIN GLYCOSYLATED A1C: CPT

## 2025-02-07 PROCEDURE — 81001 URINALYSIS AUTO W/SCOPE: CPT

## 2025-02-07 PROCEDURE — 80061 LIPID PANEL: CPT

## 2025-02-07 PROCEDURE — 87637 SARSCOV2&INF A&B&RSV AMP PRB: CPT

## 2025-02-07 PROCEDURE — 93306 TTE W/DOPPLER COMPLETE: CPT | Mod: 26

## 2025-02-07 PROCEDURE — 87086 URINE CULTURE/COLONY COUNT: CPT

## 2025-02-07 PROCEDURE — 80053 COMPREHEN METABOLIC PANEL: CPT

## 2025-02-07 PROCEDURE — 83690 ASSAY OF LIPASE: CPT

## 2025-02-07 PROCEDURE — 96374 THER/PROPH/DIAG INJ IV PUSH: CPT | Mod: XU

## 2025-02-07 PROCEDURE — 85730 THROMBOPLASTIN TIME PARTIAL: CPT

## 2025-02-07 PROCEDURE — 84484 ASSAY OF TROPONIN QUANT: CPT

## 2025-02-07 PROCEDURE — 85025 COMPLETE CBC W/AUTO DIFF WBC: CPT

## 2025-02-07 PROCEDURE — 96375 TX/PRO/DX INJ NEW DRUG ADDON: CPT | Mod: XU

## 2025-02-07 PROCEDURE — 93356 MYOCRD STRAIN IMG SPCKL TRCK: CPT

## 2025-02-07 PROCEDURE — 36415 COLL VENOUS BLD VENIPUNCTURE: CPT

## 2025-02-07 PROCEDURE — 99238 HOSP IP/OBS DSCHRG MGMT 30/<: CPT

## 2025-02-07 PROCEDURE — 71046 X-RAY EXAM CHEST 2 VIEWS: CPT

## 2025-02-07 PROCEDURE — 93306 TTE W/DOPPLER COMPLETE: CPT

## 2025-02-07 PROCEDURE — 99285 EMERGENCY DEPT VISIT HI MDM: CPT | Mod: 25

## 2025-02-07 PROCEDURE — 80048 BASIC METABOLIC PNL TOTAL CA: CPT

## 2025-02-07 PROCEDURE — 85610 PROTHROMBIN TIME: CPT

## 2025-02-07 NOTE — ED ADULT NURSE REASSESSMENT NOTE - NS ED NURSE REASSESS COMMENT FT1
pt resting in bed, c/o on/off mid-sternal chest pain since summer 2024 while being treated for abd-skin cellulitis, now worsened, exacerbated by movement, CTC pending tomorrow morning, well appearing, pain free currently, will need pre-medication prior to CTC
07.00 Received the Pt from  [facility] Pt is Observed for Chest pain for CTC & ECHO. Received the Pt A&OX 4  Pt obeys commands Nerissa N/V/D fever chills cp SOB   Comfort care & safety measures continued  IV site looks clean & dry no signs of infiltration noted pt denies  pain IV site .Pt is oriented to the unit Plan of care explained .  Pt is advised to call for help  call bell with in the reach pt verbalized the understanding .  pending CDU  MD carroll . GCS 15/15 A&OX 4 PERRLA  size 3 Strong upper & lower extremities steady gait  Pt is ambulatory & independent   No facial droop  No Hand Leg drop denies numbness tingling  Plan of care ongoing

## 2025-02-07 NOTE — ED CDU PROVIDER SUBSEQUENT DAY NOTE - ATTENDING APP SHARED VISIT CONTRIBUTION OF CARE
Attending MD Levy: I personally have seen and examined this patient.  Resident note reviewed and agree on plan of care and except where noted.  See below for details.     seen in [facility]    24M with  no reported contributory PMH/PSH/Meds, presents to the ED with chest pain for at least a month intermittently, could be months.  Found to have +flu.  Reports persistent similar chest pain.  Denies shortness of breath.      Exam:   General: calm  HENT: head NCAT, airway patent  Eyes: anicteric, no conjunctival injection   Lungs: lungs CTAB with good inspiratory effort, no wheezing, no rhonchi, no rales  Cardiac: +S1S2, no obvious m/r/g  GI: abdomen soft with +BS, NT, ND  : no CVAT  MSK: ranging neck and extremities freely  Neuro: moving all extremities spontaneously, nonfocal  Psych: normal mood and affect     A/P: 24M with flu, pending CT coronary, echo, tele

## 2025-02-07 NOTE — ED CDU PROVIDER SUBSEQUENT DAY NOTE - HISTORY
No interval changes since initial CDU provider note. Pt without new complaint. NAD VSS. no events on tele. Pending echo and CTC. - JAS Banerjee

## 2025-02-07 NOTE — ED CDU PROVIDER SUBSEQUENT DAY NOTE - PHYSICAL EXAMINATION
GEN: Pt in NAD  	PSYCH: Affect appropriate.  	EYES: Sclera white w/o injection.   	ENT: Head NCAT.   	RESP: CTA b/l, no wheezes, rales, or rhonchi.   	CARDIAC: RRR, clear distinct S1, S2, no appreciable murmurs.  	ABD: Abdomen soft, non-tender.   	VASC: No edema   SKIN: No notable rash.

## 2025-02-07 NOTE — ED CDU PROVIDER SUBSEQUENT DAY NOTE - PROGRESS NOTE DETAILS
pt states he is feeling much better, chest pain has resolved since this morning. vitals stable/afebrile. echo non actionable. pt with allergy to contrast, as per radiology will need 13 hours prep for cardiac ct and will have cardiac ct tomorrow. pt does not want to stay until tomorrow, wants to be discharged home. discussed with Dr. Levy, will sign pt out against medical advice. I discussed all risks, benefits, and alternatives to the progression of treatment and the potential dangers of leaving including but not limited to permanent disability, injury, and death.  The patient was instructed that they are welcome to change their decision to leave against medical advice and return to the emergency department at any time and for any reason in order to allow us to render care. -Kay Almonte PA-C

## 2025-03-10 NOTE — ED PROVIDER NOTE - WR ORDER DATE AND TIME 3

## 2025-07-10 NOTE — ED PROVIDER NOTE - NSFOLLOWUPINSTRUCTIONS_ED_ALL_ED_FT
Consulte con sellers médico de atención primaria dentro de 1 semana y llame al [facility] para concertar paula jaymie.    Si siente que quiere lastimarse o suicidarse, LLAME  o al 911 y venga a emergencias.    Please follow with your Primary Care Doctor within 1 week and please call the [facility] to arrange an appointment.     If you feel like you want to hurt yourself or kill yourself PLEASE CALL [phone] or [phone] and come to the ER. Detail Level: Detailed Size Of Lesion: 0.2x0.4 Size Of Lesion: 0.2x0.3